# Patient Record
Sex: FEMALE | Race: WHITE | NOT HISPANIC OR LATINO | Employment: OTHER | URBAN - METROPOLITAN AREA
[De-identification: names, ages, dates, MRNs, and addresses within clinical notes are randomized per-mention and may not be internally consistent; named-entity substitution may affect disease eponyms.]

---

## 2017-04-11 ENCOUNTER — HOSPITAL ENCOUNTER (EMERGENCY)
Facility: HOSPITAL | Age: 62
Discharge: HOME | End: 2017-04-11
Attending: EMERGENCY MEDICINE
Payer: MEDICARE

## 2017-04-11 VITALS
BODY MASS INDEX: 40.29 KG/M2 | HEART RATE: 60 BPM | DIASTOLIC BLOOD PRESSURE: 77 MMHG | RESPIRATION RATE: 20 BRPM | WEIGHT: 236 LBS | HEIGHT: 64 IN | OXYGEN SATURATION: 98 % | TEMPERATURE: 98 F | SYSTOLIC BLOOD PRESSURE: 124 MMHG

## 2017-04-11 DIAGNOSIS — H53.19 PHOTOPSIA OF RIGHT EYE: ICD-10-CM

## 2017-04-11 DIAGNOSIS — H57.11 OCULAR PAIN, RIGHT: Primary | ICD-10-CM

## 2017-04-11 PROCEDURE — 25000003 PHARM REV CODE 250: Performed by: EMERGENCY MEDICINE

## 2017-04-11 PROCEDURE — 99283 EMERGENCY DEPT VISIT LOW MDM: CPT

## 2017-04-11 RX ORDER — PROPARACAINE HYDROCHLORIDE 5 MG/ML
1 SOLUTION/ DROPS OPHTHALMIC
Status: COMPLETED | OUTPATIENT
Start: 2017-04-11 | End: 2017-04-11

## 2017-04-11 RX ORDER — CARBOXYMETHYLCELLULOSE SODIUM 5 MG/ML
1 SOLUTION/ DROPS OPHTHALMIC 3 TIMES DAILY PRN
COMMUNITY

## 2017-04-11 RX ADMIN — PROPARACAINE HYDROCHLORIDE 1 DROP: 5 SOLUTION/ DROPS OPHTHALMIC at 09:04

## 2017-04-11 RX ADMIN — FLUORESCEIN SODIUM 1 STRIP: 1 STRIP OPHTHALMIC at 09:04

## 2017-04-11 NOTE — ED AVS SNAPSHOT
[facility]  [place] [address]               Ailin Brown   2017  8:13 PM   ED    Description:  Female : 1955   Department:  [facility]           Your Care was Coordinated By:     Provider Role From To    Casey Campbell III, MD Attending Provider 17 --    Eduardo Brown, NP Nurse Practitioner 17 --      Reason for Visit     Eye Pain           Diagnoses this Visit        Comments    Ocular pain, right    -  Primary     Photopsia of right eye           ED Disposition     ED Disposition Condition Comment    Discharge  Follow up with Yoan Gar MD (or another ophthalmologist) as soon as possible for further evaluation.    Return to the emergency department for any new or worsening symptoms.            To Do List           Follow-up Information     Go to [facility].    Specialty:  Emergency Medicine    Why:  If symptoms worsen, As needed    Contact information:    [place] [address] [phone]        Follow up with Yoan Gar MD. Schedule an appointment as soon as possible for a visit in 1 day.    Specialty:  Ophthalmology    Why:  For further evaluation    Contact information:    [address] [address] [phone]        [facility]     [facility] Nurse Care Line - 24/ Assistance  Unless otherwise directed by your provider, please contact [facility], our nurse care line that is available for /7 assistance.     Registered nurses in the [facility] provide: appointment scheduling, clinical advisement, health education, and other advisory services.  Call: [phone] (toll free)               Medications           Message regarding Medications     Verify the changes and/or additions to your medication regime listed below are the same as discussed with your clinician today.  If any of these  "changes or additions are incorrect, please notify your healthcare provider.        These medications were administered today        Dose Freq    proparacaine 0.5 % ophthalmic solution 1 drop 1 drop ED 1 Time    Sig: Place 1 drop into the right eye ED 1 Time.    Class: Normal    Route: Right Eye    fluorescein ophthalmic strip 1 strip 1 strip ED 1 Time    Sig: Place 1 strip into the right eye ED 1 Time.    Class: Normal    Route: Right Eye           Verify that the below list of medications is an accurate representation of the medications you are currently taking.  If none reported, the list may be blank. If incorrect, please contact your healthcare provider. Carry this list with you in case of emergency.           Current Medications     carboxymethylcellulose (REFRESH PLUS) 0.5 % Dpet 1 drop 3 (three) times daily as needed.           Clinical Reference Information           Your Vitals Were     BP Pulse Temp Resp Height Weight    137/61 (BP Location: Left arm, Patient Position: Sitting) 61 98.1 °F (36.7 °C) (Oral) 18 5' 4" (1.626 m) 107 kg (236 lb)    SpO2 BMI             97% 40.51 kg/m2         Allergies as of 4/11/2017        Reactions    Shellfish Containing Products Anaphylaxis      Immunizations Administered on Date of Encounter - 4/11/2017     None      ED Micro, Lab, POCT     None      ED Imaging Orders     None        Discharge Instructions       Follow up with Yoan Gar MD (or another ophthalmologist) as soon as possible for further evaluation.    Return to the emergency department for any new or worsening symptoms.     Discharge References/Attachments     EYE, HOW IT WORKS (ENGLISH)      [facility] Sign-Up     Activating your [facility] account is as easy as 1-2-3!     1) Visit [email or web address], select Sign Up Now, enter this activation code and your date of birth, then select Next.  [email or web address]  Expires: 5/26/2017 10:16 PM      2) Create a username and password to use when you visit [facility] " in the future and select a security question in case you lose your password and select Next.    3) Enter your e-mail address and click Sign Up!    Additional Information  If you have questions, please e-mail [email or web address] or call [phone] to talk to our [facility] staff. Remember, [facility] is NOT to be used for urgent needs. For medical emergencies, dial 911.          [facility] complies with applicable Federal civil rights laws and does not discriminate on the basis of race, color, national origin, age, disability, or sex.        Language Assistance Services     ATTENTION: Language assistance services are available, free of charge. Please call [phone].      ATENCIÓN: Si adrienne annie, tiene a donaldson disposición servicios gratuitos de asistencia lingüística. Llame al [phone].     CHÚ Ý: N?u b?n nói Ti?ng Vi?t, có các d?ch v? h? tr? ngôn ng? mi?n phí dành cho b?n. G?i s? [phone].

## 2017-04-12 NOTE — DISCHARGE INSTRUCTIONS
Follow up with Yoan Gar MD (or another ophthalmologist) as soon as possible for further evaluation.    Return to the emergency department for any new or worsening symptoms.

## 2017-04-12 NOTE — ED TRIAGE NOTES
"Pt reports she has had a hx of flashes to her right eye last November, and reports she has had another episode today. Pt states, "My eye doctor told me to use artifical tears, and I used it, and it didn't help." Pt reports pain and blurred vision to right eye that began 0300 this morning.  "

## 2017-04-12 NOTE — ED PROVIDER NOTES
"Encounter Date: 4/11/2017       History     Chief Complaint   Patient presents with    Eye Pain     "Karmen got something in my right eye and mits getting worse ."     Review of patient's allergies indicates:  Allergies not on file  HPI Comments: 61 year old female c/o sudden right eye pain and a circular black disc in central visual field that began several hours ago. Pain has been continuous but waxes and wane. Also complaining of flashes of light in the eye. Denies trauma, known foreign body or other precipitating event. Pt was knitting at the time symptoms began. Pt has been using carboxymethylcellulose (Refresh Plus) drops for chronic xerophthalmia. She denies eye discharge.    The history is provided by the patient.     History reviewed. No pertinent past medical history.  Past Surgical History:   Procedure Laterality Date    CARDIAC PACEMAKER PLACEMENT      FOOT SURGERY       Family History   Problem Relation Age of Onset    Retinal detachment Mother     Blindness Brother      Social History   Substance Use Topics    Smoking status: Never Smoker    Smokeless tobacco: None    Alcohol use No     Review of Systems   Constitutional: Negative for chills and fever.   HENT: Negative for congestion, ear pain, postnasal drip, rhinorrhea, sore throat and voice change.    Eyes: Positive for pain, itching and visual disturbance. Negative for photophobia, discharge and redness.   Respiratory: Negative for apnea, cough, chest tightness, shortness of breath and wheezing.    Cardiovascular: Negative for chest pain, palpitations and leg swelling.   Gastrointestinal: Negative for abdominal pain, diarrhea, nausea and vomiting.   Genitourinary: Negative for difficulty urinating and dysuria.   Musculoskeletal: Negative for arthralgias, back pain, myalgias and neck pain.   Skin: Negative for color change, pallor, rash and wound.   Neurological: Negative for dizziness, syncope, speech difficulty and headaches.   Hematological: " Negative for adenopathy.   Psychiatric/Behavioral: The patient is not nervous/anxious.        Physical Exam   Initial Vitals   BP Pulse Resp Temp SpO2   04/11/17 1931 04/11/17 1931 04/11/17 1931 04/11/17 1931 04/11/17 1931   137/61 61 18 98.1 °F (36.7 °C) 97 %     Physical Exam    Nursing note and vitals reviewed.  Constitutional: She appears well-developed and well-nourished. She is not diaphoretic. No distress.   HENT:   Head: Normocephalic and atraumatic.   Right Ear: External ear normal.   Left Ear: External ear normal.   Nose: Nose normal.   Eyes: Conjunctivae, EOM and lids are normal. Pupils are equal, round, and reactive to light. Lids are everted and swept, no foreign bodies found. Right eye exhibits no chemosis, no discharge, no exudate and no hordeolum. No foreign body present in the right eye. Left eye exhibits no chemosis, no discharge, no exudate and no hordeolum. No foreign body present in the left eye. Right conjunctiva is not injected. Right conjunctiva has no hemorrhage. Left conjunctiva is not injected. Left conjunctiva has no hemorrhage. No scleral icterus. Right eye exhibits normal extraocular motion and no nystagmus. Left eye exhibits normal extraocular motion and no nystagmus.   Fundoscopic exam:       The right eye shows no AV nicking, no exudate, no hemorrhage and no papilledema. The right eye shows red reflex.   Slit lamp exam:       The right eye shows no corneal abrasion, no corneal flare, no corneal ulcer, no foreign body, no hyphema, no hypopyon and no anterior chamber bulge.   Red reflex present bilaterally   Neck: Normal range of motion. Neck supple. No thyromegaly present. No tracheal deviation present. No JVD present.   Cardiovascular: Normal rate, regular rhythm, normal heart sounds and intact distal pulses. Exam reveals no gallop and no friction rub.    No murmur heard.  Pulmonary/Chest: Breath sounds normal. No stridor. No respiratory distress. She has no wheezes. She has no  rhonchi. She has no rales. She exhibits no tenderness.   Abdominal: Soft. Bowel sounds are normal. She exhibits no distension and no mass. There is no tenderness. There is no rebound and no guarding.   Musculoskeletal: Normal range of motion. She exhibits no edema or tenderness.   Lymphadenopathy:     She has no cervical adenopathy.   Neurological: She is alert and oriented to person, place, and time. She has normal strength and normal reflexes. She displays normal reflexes. No cranial nerve deficit or sensory deficit.   Skin: Skin is warm and dry. No rash and no abscess noted. No erythema. No pallor.   Psychiatric: She has a normal mood and affect. Her behavior is normal. Judgment and thought content normal.         ED Course   Procedures  Labs Reviewed - No data to display          Medical Decision Making:   ED Management:  Emergent evaluation of a 61 year old female with Hx of arrhythmias presenting with right eye pain and visual disturbance that began several hours ago and has been continuous. Pain waxes and wanes but is constantly present. She reports central visual disturbance described as a dark disc and photopsia. On exam pulse indicates rhythm is regular. Implanted pacemaker noted. Red reflex is present. PERRLA. Visual acuity with glasses 20/25 R eye; 20/40 L eye. IOP right eye checked x 3 is 15 mm Hg, 17 mm Hg, 16 mm Hg. Fluorescein stain and Wood's lamp illumination revealed no abrasion, ulceration, or foreign body. Eyelid eversion reveals no abnormalities. Slit lamp exam performed by Casey Campbell MD did not reveal any abnormalities. Bedside ocular ultrasound performed by Casey Campbell MD shows no evidence of retinal detachment or other abnormality. Based on these findings I suspect right eye pain and visual disturbance. I considered but doubt migraine with aura, iritis, retinal artery occulusion, ocular foreign body, corneal abrasion. Instructed pt to follow up with ophthalmology as soon as possible. Pt  discharged home with instructions for follow up and supportive care. ED return precautions given. Pt verbalized understanding of all information and instructions given. This case was discussed with Casey Campbell MD who agreed with the assessment and plan.    Other:   I have discussed this case with another health care provider.              Attending Attestation:     Physician Attestation Statement for NP/PA:   I have conducted a face to face encounter with this patient in addition to the NP/PA, due to NP/PA Request    Other NP/PA Attestation Additions:      Medical Decision Makin yo F p/w acute onset right eye pain, scotoma, and pruritus that began a few hours ago.  Scotoma described as a dark [place] in the center of her visual axis.  She does also report some flashing lights.  No headache.  No trauma, + FB sensation. H/o chronic R eye irritation for which she saw an Ophthalmologist a few months ago.  After instillation of proparacaine drops the patient does report improvement in her pain and foreign body sensation.  I have measured her intraocular pressure on the right at 15, 17, and 16.  There is no fluorescein uptake.  Slit-lamp examination does not reveal cell and flare, hypopyon, or hyphema.  I have performed a bedside ultrasound to screen for retinal detachment and there was none visualized.  Visual acuity in the affected eye is better than in the unaffected eye.  Presence of pain with onset of symptoms makes CRAO/CRVO much less likely.  ? Ocular migraine vs retinal detachment vs other. Will recommend f/u with Ophthalmology tomorrow.                   ED Course     Clinical Impression:   The primary encounter diagnosis was Ocular pain, right. A diagnosis of Photopsia of right eye was also pertinent to this visit.    Disposition:   Disposition: Discharged  Condition: Stable       Eduardo Brown NP  17 [id number]       Casey Campbell III, MD  17 [phone]

## 2017-04-12 NOTE — ED PROVIDER NOTES
"Encounter Date: 2017       History     Chief Complaint   Patient presents with    Eye Pain     "Karmen got something in my right eye and mits getting worse ."     Review of patient's allergies indicates:   Allergen Reactions    Shellfish containing products Anaphylaxis     HPI  History reviewed. No pertinent past medical history.  Past Surgical History:   Procedure Laterality Date    CARDIAC PACEMAKER PLACEMENT      FOOT SURGERY       Family History   Problem Relation Age of Onset    Retinal detachment Mother     Blindness Brother      Social History   Substance Use Topics    Smoking status: Never Smoker    Smokeless tobacco: None    Alcohol use No     Review of Systems    Physical Exam   Initial Vitals   BP Pulse Resp Temp SpO2   17   137/61 61 18 98.1 °F (36.7 °C) 97 %     Physical Exam    ED Course   Procedures  Labs Reviewed - No data to display                       Attending Attestation:     Physician Attestation Statement for NP/PA:   I have conducted a face to face encounter with this patient in addition to the NP/PA, due to Medical Complexity    Other NP/PA Attestation Additions:      Medical Decision Makin yo F p/w R eye pain and pruritis that began a few hours ago. Also notes a dark [place] in the center of the visual axis. No trauma, no FB sensation. H/o chronic R eye irritation for which she saw an Ophthalmologist a few months ago.                  ED Course     Clinical Impression:   {Add your Clinical Impression here. If you haven't documented one yet, please pend the note, finalize a Clinical Impression, and refresh your note before signing.:[id number]}       "

## 2017-04-25 ENCOUNTER — TELEPHONE (OUTPATIENT)
Dept: OPHTHALMOLOGY | Facility: CLINIC | Age: 62
End: 2017-04-25

## 2017-04-27 ENCOUNTER — OFFICE VISIT (OUTPATIENT)
Dept: OPHTHALMOLOGY | Facility: CLINIC | Age: 62
End: 2017-04-27
Payer: MEDICARE

## 2017-04-27 DIAGNOSIS — H57.11 EYE PAIN, RIGHT: ICD-10-CM

## 2017-04-27 DIAGNOSIS — H43.811 VITREOUS DETACHMENT, RIGHT: Primary | ICD-10-CM

## 2017-04-27 DIAGNOSIS — I10 ESSENTIAL HYPERTENSION: ICD-10-CM

## 2017-04-27 DIAGNOSIS — E11.9 DM TYPE 2 WITHOUT RETINOPATHY: ICD-10-CM

## 2017-04-27 DIAGNOSIS — H52.7 REFRACTIVE ERROR: ICD-10-CM

## 2017-04-27 PROCEDURE — 99212 OFFICE O/P EST SF 10 MIN: CPT | Mod: PBBFAC,PO | Performed by: OPHTHALMOLOGY

## 2017-04-27 PROCEDURE — 99999 PR PBB SHADOW E&M-EST. PATIENT-LVL II: CPT | Mod: PBBFAC,,, | Performed by: OPHTHALMOLOGY

## 2017-04-27 PROCEDURE — 92004 COMPRE OPH EXAM NEW PT 1/>: CPT | Mod: S$PBB,,, | Performed by: OPHTHALMOLOGY

## 2017-04-27 RX ORDER — LOSARTAN POTASSIUM 25 MG/1
TABLET ORAL
Refills: 3 | COMMUNITY
Start: 2017-03-22

## 2017-04-27 RX ORDER — ZOLPIDEM TARTRATE 10 MG/1
TABLET ORAL NIGHTLY PRN
COMMUNITY
Start: 2017-04-26

## 2017-04-27 RX ORDER — ALBUTEROL SULFATE 90 UG/1
2 AEROSOL, METERED RESPIRATORY (INHALATION) EVERY 6 HOURS PRN
COMMUNITY

## 2017-04-27 RX ORDER — GLIMEPIRIDE 2 MG/1
TABLET ORAL
Refills: 3 | COMMUNITY
Start: 2017-03-22

## 2017-04-27 RX ORDER — FEXOFENADINE HCL AND PSEUDOEPHEDRINE HCI 180; 240 MG/1; MG/1
1 TABLET, EXTENDED RELEASE ORAL DAILY
COMMUNITY
End: 2018-03-28 | Stop reason: ALTCHOICE

## 2017-04-27 RX ORDER — ASPIRIN 325 MG
325 TABLET ORAL DAILY
COMMUNITY
End: 2018-02-14

## 2017-04-27 RX ORDER — FENOFIBRATE 30 MG/1
30 CAPSULE ORAL DAILY
COMMUNITY

## 2017-04-27 RX ORDER — ATENOLOL 25 MG/1
TABLET ORAL
Refills: 3 | COMMUNITY
Start: 2017-03-22

## 2017-04-27 NOTE — MR AVS SNAPSHOT
[facility]  [address]  [place] [address]  Phone: [phone]  Fax: [phone]                  Ailin Brown   2017 3:00 PM   Office Visit    Description:  Female : 1955   Provider:  Yoan Gar MD   Department:  [facility]           Reason for Visit     Concerns About Ocular Health           Diagnoses this Visit        Comments    Vitreous detachment, right    -  Primary     Eye pain, right         DM type 2 without retinopathy         Essential hypertension         Refractive error                To Do List           Future Appointments        Provider Department Dept Phone    2017 4:00 PM Yoan Gar MD [facility] [phone]      Goals (5 Years of Data)     None      Follow-Up and Disposition     Return in about 2 weeks (around 2017) for MRx (EPRX).      [facility]     [facility] On Call Nurse Care Line - 24/ Assistance  Unless otherwise directed by your provider, please contact [facility], our nurse care line that is available for  assistance.     Registered nurses in the [facility] On Call Center provide: appointment scheduling, clinical advisement, health education, and other advisory services.  Call: [phone] (toll free)               Medications           Message regarding Medications     Verify the changes and/or additions to your medication regime listed below are the same as discussed with your clinician today.  If any of these changes or additions are incorrect, please notify your healthcare provider.             Verify that the below list of medications is an accurate representation of the medications you are currently taking.  If none reported, the list may be blank. If incorrect, please contact your healthcare provider. Carry this list with you in case of emergency.           Current Medications     ACCU-CHEK COMPACT PLUS TEST Strp USE ONCE D TO TEST BLOOD SUGAR    albuterol  (PROAIR HFA) 90 mcg/actuation inhaler Inhale 2 puffs into the lungs every 6 (six) hours as needed for Wheezing. Rescue    aspirin 325 MG tablet Take 325 mg by mouth once daily.    atenolol (TENORMIN) 25 MG tablet TK 1 T PO D    fenofibrate micronized 30 mg Cap Take 30 mg by mouth once daily.    fexofenadine-pseudoephedrine (ALLEGRA-D 24 HOUR) 180-240 mg per 24 hr tablet Take 1 tablet by mouth once daily.    glimepiride (AMARYL) 2 MG tablet TK 1 T PO D WITH XENA    losartan (COZAAR) 25 MG tablet TK 1 T PO BID    zolpidem (AMBIEN) 10 mg Tab     carboxymethylcellulose (REFRESH PLUS) 0.5 % Dpet 1 drop 3 (three) times daily as needed.           Clinical Reference Information           Allergies as of 4/27/2017     Shellfish Containing Products      Immunizations Administered on Date of Encounter - 4/27/2017     None      [facility] Sign-Up     Activating your [facility] account is as easy as 1-2-3!     1) Visit [email or web address], select Sign Up Now, enter this activation code and your date of birth, then select Next.  [email or web address]  Expires: 5/26/2017 10:16 PM      2) Create a username and password to use when you visit [facility] in the future and select a security question in case you lose your password and select Next.    3) Enter your e-mail address and click Sign Up!    Additional Information  If you have questions, please e-mail [email or web address] or call [phone] to talk to our [facility] staff. Remember, [facility] is NOT to be used for urgent needs. For medical emergencies, dial 911.         Language Assistance Services     ATTENTION: Language assistance services are available, free of charge. Please call [phone].      ATENCIÓN: Si habla español, tiene a donaldson disposición servicios gratuitos de asistencia lingüística. Llame al [phone].     CHÚ Ý: N?u b?n nói Ti?ng Vi?t, có các d?ch v? h? tr? ngôn ng? mi?n phí dành cho b?n. G?i s? [phone].         [facility] complies with  applicable Federal civil rights laws and does not discriminate on the basis of race, color, national origin, age, disability, or sex.

## 2017-04-27 NOTE — PROGRESS NOTES
Subjective:       Patient ID: Ailin Brown is a 61 y.o. female.    Chief Complaint: Concerns About Ocular Health (Pt states that in the novemeber light from oncoming cars was bothersome an cause Flashes of light.)    HPI  Review of Systems    Objective:      Physical Exam    Assessment:       1. Vitreous detachment, right    2. Eye pain, right    3. DM type 2 without retinopathy    4. Essential hypertension    5. Refractive error        Plan:       PVD OD-Causing floaters.  Eye pain OD-Most likely from accommodative spasms from blurry vision OD>OS.  DM-No NPDR OU.  HTN-No retinopathy OU.  RE-Needs MRx.        Control DM & HTN.  RTC 2 wks for MRx.

## 2017-05-11 ENCOUNTER — OFFICE VISIT (OUTPATIENT)
Dept: OPHTHALMOLOGY | Facility: CLINIC | Age: 62
End: 2017-05-11
Payer: MEDICARE

## 2017-05-11 DIAGNOSIS — H52.7 REFRACTIVE ERROR: Primary | ICD-10-CM

## 2017-05-11 DIAGNOSIS — H57.11 EYE PAIN, RIGHT: ICD-10-CM

## 2017-05-11 PROCEDURE — 99212 OFFICE O/P EST SF 10 MIN: CPT | Mod: PBBFAC,PO | Performed by: OPHTHALMOLOGY

## 2017-05-11 PROCEDURE — 99499 UNLISTED E&M SERVICE: CPT | Mod: S$PBB,,, | Performed by: OPHTHALMOLOGY

## 2017-05-11 PROCEDURE — 99999 PR PBB SHADOW E&M-EST. PATIENT-LVL II: CPT | Mod: PBBFAC,,, | Performed by: OPHTHALMOLOGY

## 2017-05-11 NOTE — MR AVS SNAPSHOT
[facility]  [address]  [place] [address]  Phone: [phone]  Fax: [phone]                  Ailin Brown   2017 4:00 PM   Office Visit    Description:  Female : 1955   Provider:  Yoan Gar MD   Department:  [facility]           Reason for Visit     Concerns About Ocular Health           Diagnoses this Visit        Comments    Refractive error    -  Primary     Eye pain, right                To Do List           Goals (5 Years of Data)     None      Follow-Up and Disposition     Return in about 1 year (around 2018) for 1 yr F/U..      [facility]     [facility] Care [facility] -  Assistance  Unless otherwise directed by your provider, please contact [facility], our nurse care line that is available for  assistance.     Registered nurses in the [facility] provide: appointment scheduling, clinical advisement, health education, and other advisory services.  Call: [phone] (toll free)               Medications           Message regarding Medications     Verify the changes and/or additions to your medication regime listed below are the same as discussed with your clinician today.  If any of these changes or additions are incorrect, please notify your healthcare provider.             Verify that the below list of medications is an accurate representation of the medications you are currently taking.  If none reported, the list may be blank. If incorrect, please contact your healthcare provider. Carry this list with you in case of emergency.           Current Medications     ACCU-CHEK COMPACT PLUS TEST Strp USE ONCE D TO TEST BLOOD SUGAR    albuterol (PROAIR HFA) 90 mcg/actuation inhaler Inhale 2 puffs into the lungs every 6 (six) hours as needed for Wheezing. Rescue    aspirin 325 MG tablet Take 325 mg by mouth once daily.    atenolol (TENORMIN) 25 MG tablet TK 1 T PO D    carboxymethylcellulose  (REFRESH PLUS) 0.5 % Dpet 1 drop 3 (three) times daily as needed.    fenofibrate micronized 30 mg Cap Take 30 mg by mouth once daily.    fexofenadine-pseudoephedrine (ALLEGRA-D 24 HOUR) 180-240 mg per 24 hr tablet Take 1 tablet by mouth once daily.    glimepiride (AMARYL) 2 MG tablet TK 1 T PO D WITH XENA    losartan (COZAAR) 25 MG tablet TK 1 T PO BID    zolpidem (AMBIEN) 10 mg Tab            Clinical Reference Information           Allergies as of 5/11/2017     Shellfish Containing Products      Immunizations Administered on Date of Encounter - 5/11/2017     None      [facility] Sign-Up     Activating your [facility] account is as easy as 1-2-3!     1) Visit [email or web address], select Sign Up Now, enter this activation code and your date of birth, then select Next.  [email or web address]  Expires: 5/26/2017 10:16 PM      2) Create a username and password to use when you visit [facility] in the future and select a security question in case you lose your password and select Next.    3) Enter your e-mail address and click Sign Up!    Additional Information  If you have questions, please e-mail [email or web address] or call [phone] to talk to our [facility] staff. Remember, [facility] is NOT to be used for urgent needs. For medical emergencies, dial 911.         Language Assistance Services     ATTENTION: Language assistance services are available, free of charge. Please call [phone].      ATENCIÓN: Si habla español, tiene a donaldson disposición servicios gratuitos de asistencia lingüística. Llame al [phone].     CHÚ Ý: N?u b?n nói Ti?ng Vi?t, có các d?ch v? h? tr? ngôn ng? mi?n phí dành cho b?n. G?i s? [phone].         [facility] complies with applicable Federal civil rights laws and does not discriminate on the basis of race, color, national origin, age, disability, or sex.

## 2017-05-12 NOTE — PROGRESS NOTES
Subjective:       Patient ID: Ailin Brown is a 61 y.o. female.    Chief Complaint: Concerns About Ocular Health (MRX )    HPI  Review of Systems    Objective:      Physical Exam    Assessment:       1. Refractive error    2. Eye pain, right        Plan:       RE-Pt wants MRx.  Eye pain-Most likely from accommodative spasms.      Give MRx.  RTC 1 yr.

## 2018-02-14 ENCOUNTER — HOSPITAL ENCOUNTER (EMERGENCY)
Facility: HOSPITAL | Age: 63
Discharge: HOME | End: 2018-02-14
Attending: EMERGENCY MEDICINE
Payer: MEDICARE

## 2018-02-14 ENCOUNTER — OFFICE VISIT (OUTPATIENT)
Dept: CARDIOLOGY | Facility: CLINIC | Age: 63
End: 2018-02-14
Payer: MEDICARE

## 2018-02-14 ENCOUNTER — ANTI-COAG VISIT (OUTPATIENT)
Dept: CARDIOLOGY | Facility: CLINIC | Age: 63
End: 2018-02-14

## 2018-02-14 VITALS
RESPIRATION RATE: 16 BRPM | SYSTOLIC BLOOD PRESSURE: 116 MMHG | HEIGHT: 64 IN | OXYGEN SATURATION: 96 % | DIASTOLIC BLOOD PRESSURE: 74 MMHG | BODY MASS INDEX: 39.09 KG/M2 | WEIGHT: 229 LBS | HEART RATE: 60 BPM | TEMPERATURE: 99 F

## 2018-02-14 VITALS
SYSTOLIC BLOOD PRESSURE: 121 MMHG | OXYGEN SATURATION: 95 % | WEIGHT: 229 LBS | BODY MASS INDEX: 39.09 KG/M2 | DIASTOLIC BLOOD PRESSURE: 63 MMHG | RESPIRATION RATE: 15 BRPM | HEIGHT: 64 IN | HEART RATE: 61 BPM

## 2018-02-14 DIAGNOSIS — I25.10 CORONARY ARTERY DISEASE INVOLVING NATIVE CORONARY ARTERY OF NATIVE HEART WITHOUT ANGINA PECTORIS: ICD-10-CM

## 2018-02-14 DIAGNOSIS — E66.01 MORBID OBESITY, UNSPECIFIED OBESITY TYPE: ICD-10-CM

## 2018-02-14 DIAGNOSIS — I48.20 CHRONIC ATRIAL FIBRILLATION: ICD-10-CM

## 2018-02-14 DIAGNOSIS — I48.20 CHRONIC ATRIAL FIBRILLATION: Primary | ICD-10-CM

## 2018-02-14 DIAGNOSIS — I10 ESSENTIAL HYPERTENSION: ICD-10-CM

## 2018-02-14 DIAGNOSIS — E78.5 HYPERLIPIDEMIA, UNSPECIFIED HYPERLIPIDEMIA TYPE: ICD-10-CM

## 2018-02-14 DIAGNOSIS — Z79.01 LONG TERM (CURRENT) USE OF ANTICOAGULANTS: Primary | ICD-10-CM

## 2018-02-14 DIAGNOSIS — T14.90XA TRAUMA: Primary | ICD-10-CM

## 2018-02-14 DIAGNOSIS — E11.9 DM TYPE 2 WITHOUT RETINOPATHY: ICD-10-CM

## 2018-02-14 DIAGNOSIS — S93.504A: ICD-10-CM

## 2018-02-14 PROCEDURE — 99205 OFFICE O/P NEW HI 60 MIN: CPT | Mod: S$PBB,,, | Performed by: INTERNAL MEDICINE

## 2018-02-14 PROCEDURE — 93005 ELECTROCARDIOGRAM TRACING: CPT | Mod: PBBFAC | Performed by: INTERNAL MEDICINE

## 2018-02-14 PROCEDURE — 99283 EMERGENCY DEPT VISIT LOW MDM: CPT | Mod: 25,27

## 2018-02-14 PROCEDURE — 93010 ELECTROCARDIOGRAM REPORT: CPT | Mod: S$PBB,,, | Performed by: INTERNAL MEDICINE

## 2018-02-14 PROCEDURE — 99214 OFFICE O/P EST MOD 30 MIN: CPT | Mod: PBBFAC,25 | Performed by: INTERNAL MEDICINE

## 2018-02-14 PROCEDURE — 99999 PR PBB SHADOW E&M-EST. PATIENT-LVL IV: CPT | Mod: PBBFAC,,, | Performed by: INTERNAL MEDICINE

## 2018-02-14 RX ORDER — ASPIRIN 81 MG/1
81 TABLET ORAL DAILY
Qty: 90 TABLET | Refills: 3 | COMMUNITY
Start: 2018-02-14 | End: 2018-03-28 | Stop reason: ALTCHOICE

## 2018-02-14 RX ORDER — IBUPROFEN 600 MG/1
600 TABLET ORAL
Status: DISCONTINUED | OUTPATIENT
Start: 2018-02-14 | End: 2018-02-14

## 2018-02-14 RX ORDER — WARFARIN SODIUM 5 MG/1
5 TABLET ORAL DAILY
Qty: 30 TABLET | Refills: 11 | Status: SHIPPED | OUTPATIENT
Start: 2018-02-14 | End: 2018-05-17 | Stop reason: ALTCHOICE

## 2018-02-14 NOTE — DISCHARGE INSTRUCTIONS
Try to get plenty of rest and stay well-hydrated on these medications.  You can wear the buddy tape to help immobilize the toe and allow it to rest.  RICE - Rest, Ice, Compress, Elevate (see handout).    Please take medications as prescribed.    Take Ibuprofen or Aleve for pain, as needed.    Follow up with orthopedics in 1 week if symptoms have not improved.    Return to ER for any new or worsening symptoms.

## 2018-02-14 NOTE — PROGRESS NOTES
62 year old patient with PMHx including essential hypertension, chronic atrial fibrillation, prior stroke, hyperlipidemia, CAD, MI, DM type 2, morbid obesity. Per ntoes, she has tried coumadin as well as other anticoagulants in the past but could not tolerate. However, She needs to be placed back on coumadin in preparation for Watchman implant in [place], which is scheduled for 3/5/18. Per notes from her visit with Dr Harris today, start warfarin 5mg daily. lmfcb

## 2018-02-14 NOTE — ED PROVIDER NOTES
"Encounter Date: 2/14/2018    SCRIBE #1 NOTE: I, Elmira Oconnor, am scribing for, and in the presence of,  Suki Jones NP. I have scribed the following portions of the note - Other sections scribed: HPI and ROS.       History     Chief Complaint   Patient presents with    Toe Pain     "I came to see Cardiologist and fell in parking lot before I saw him and I hurt my toes. He told me to come to fast track." pt complains of rigth foot toe pain, states she is diabetic; denies hitting head or LOC, states she was dizzy and has numerous synopal episodes, cardiologist saw her after the incident, checked her out, and told her to come to fast track to get toes checked out    Fall     CC: Fall and Toe Pain    HPI: The pt is a 62 y.o. F with a PMHx that includes asthma, diabetes, and HTN who presents to the ED for evaluation of a fall and c/o toe pain x today at 1000 hrs. Pt states that in the parking lot on her way to see the cardiologist today, pt suddenly felt dizzy and then began to fall but caught herself before actually falling. Pt states that the 2nd, 4th, and 5th toes of her feet "curled under" when she fell. She states that she has had syncopal episodes w/ dizziness in the past. Pt says that she was still able to visit her cardiologist, but they sent her to this ED to get her toes checked. She currently reports bruising and pain on range of motion in the 3rd toe of her R foot. No attempted treatments reported. Pt otherwise denies current dizziness, head trauma, chest pain, palpitations, neck pain, back pain, and other associated symptoms.       The history is provided by the patient. No  was used.     Review of patient's allergies indicates:   Allergen Reactions    Shellfish containing products Anaphylaxis     Past Medical History:   Diagnosis Date    Asthma     Cataract     Diabetes mellitus     Hypertension      Past Surgical History:   Procedure Laterality Date    ATRIAL CARDIAC PACEMAKER " INSERTION      CARDIAC PACEMAKER PLACEMENT      FOOT SURGERY       Family History   Problem Relation Age of Onset    Retinal detachment Mother     Blindness Brother     Amblyopia Brother     Macular degeneration Brother     Retinal detachment Brother     Glaucoma Father     Glaucoma Paternal Grandmother     Blindness Paternal Grandmother     Cataracts Neg Hx     Strabismus Neg Hx      Social History   Substance Use Topics    Smoking status: Never Smoker    Smokeless tobacco: Never Used    Alcohol use No     Review of Systems   Constitutional: Negative for chills, diaphoresis and fever.   HENT: Negative for ear pain and sore throat.         (-) head trauma   Eyes: Negative for redness.   Respiratory: Negative for cough and shortness of breath.    Cardiovascular: Negative for chest pain and palpitations.   Gastrointestinal: Negative for abdominal pain, diarrhea, nausea and vomiting.   Genitourinary: Negative for dysuria.   Musculoskeletal: Negative for back pain and neck pain.        (+) 3rd toe pain on R foot (7/10)   Skin: Positive for color change (bruising on 3rd toe of R foot). Negative for rash.   Neurological: Negative for dizziness, syncope and headaches.       Physical Exam     Initial Vitals [02/14/18 1058]   BP Pulse Resp Temp SpO2   (!) 127/58 60 20 98.1 °F (36.7 °C) 95 %      MAP       81         Physical Exam    Nursing note and vitals reviewed.  Constitutional: Vital signs are normal. She appears well-developed and well-nourished. She is not diaphoretic. She is active and cooperative. She does not appear ill. No distress.   Musculoskeletal:        Right foot: There is tenderness and bony tenderness. There is normal range of motion, no swelling, normal capillary refill, no crepitus, no deformity and no laceration.        Feet:    Neurological: She is oriented to person, place, and time.   Skin: Skin is warm and dry. Capillary refill takes less than 2 seconds.         ED Course    Procedures  Labs Reviewed - No data to display       X-Rays:   Independently Interpreted Readings:   Other Readings:  X-ray right third toe with no obvious fracture identified.  No dislocation.       Additional MDM:   Comments: This is an urgent evaluation of a 62-year-old female that presents to the emergency room complaining of toe pain this morning.  Patient reports that she stumbled in the parking lot outside the hospital this morning after a dizzy episode.  Patient has a history of syncopal episodes and was on the way to her cardiologist appointment; she was still seen at cardiology this morning and cleared.  She is asymptomatic on exam without any dizziness or chest pain, shortness of breath, weakness.  She complains of toe pain alone; there is mild tenderness palpation and ecchymosis over the distal phalanx of the right third toe.  The rest of her foot exam was unremarkable.  There is no sensory or motor deficits, open wounds, NV deficits.  She is able to range the toe, though with pain.  Differentials included: Soft tissue injury versus fracture, dislocation.  No evidence support infectious process, compartment syndrome, gout.  XR of the toe shows no definitive fracture.  There is no dislocation identified.  There is a lucency identified in the distal phalanx of the third toe that extends to the soft tissue that is most likely an artifact.  Will treat as sprain apply denis tape.  Recommended orthopedics follow-up within 1 week for further evaluation if there is no significant improvement.  Recommended RICE and she reports that she can take Aleve at home.  Return precautions were given for any new or worsening symptoms or concerns..          Scribe Attestation:   Scribe #1: I performed the above scribed service and the documentation accurately describes the services I performed. I attest to the accuracy of the note.    Attending Attestation:     Physician Attestation Statement for NP/PA:   I discussed this  assessment and plan of this patient with the NP/PA, but I did not personally examine the patient. The face to face encounter was performed by the NP/PA.        Physician Attestation for Scribe:  Physician Attestation Statement for Scribe #1: I, Suki Jones NP, reviewed documentation, as scribed by Elmira Oconnor in my presence, and it is both accurate and complete.                 ED Course      Clinical Impression:   The primary encounter diagnosis was Trauma. A diagnosis of Sprain of third toe of right foot, initial encounter was also pertinent to this visit.    Disposition:   Disposition: Discharged  Condition: Stable                        Suki Jones NP  02/14/18 2329

## 2018-02-14 NOTE — PROGRESS NOTES
CARDIOVASCULAR CONSULTATION    REASON FOR CONSULT:   Ailin Brown is a 62 y.o. female who presents for initiation of CV mgmt.    PCP: Angelica ([place]) [phone]  Card: Lindsay ([place]) [phone]  EP: Arturo ([place]) [phone]  HISTORY OF PRESENT ILLNESS:   The patient is a very pleasant 62-year-old  woman who is self-referred for ongoing cardiac management.  She has a complicated past cardiovascular history as outlined below.  She describes a chronic history of dizziness and palpitations as well as shortness of breath.  She does not describe any angina.  She does describe episodic syncope, and apparently her pacemaker is at and end-of-life.  She has plans in early March of this year to return to her caregivers in [place] for a watchman left atrial appendage occlusion device implant, as well as pacemaker generator replacement.  She denies any PND, orthopnea, or lower extremity edema.  There's been no melena, hematuria, or claudicant symptoms.  The patient does describe a history of atrial fibrillation and prior stroke as well as prior coronary artery disease and myocardial infarction.  She apparently has tried taking both Coumadin, as well as several of the novel oral anticoagulants, and is unable to tolerate any of these medications.  She does tell and that she needs to be placed back on Coumadin in preparation for the watchman implant.  She does not know her usual prior dose of Coumadin, and I plan to start on 5 mg nightly with referral to R Coumadin monitoring clinic for management of her INR.  I've also asked that we obtain records from her prior cardiovascular physicians.    Of note, the patient tells me she stubbed her toe on the way in and think she's broken it.  It appears she's able to move the toe, but I have referred her to urgent care for further evaluation.    Family history appears to be notable for coronary artery disease with the patient's father having myocardial  infarction in his 50s.    The patient is a never smoker.    CARDIOVASCULAR HISTORY:   Chr AF, intol of coumadin with plans for Watchman implant 3/5/18 at [facility] PPM, s/p AVN ablation and PPM implant  Chr AF, prev intol of coumadin/NOAC, hx CVA  ?CAD/MI  ?VSD, spont closed    PAST MEDICAL HISTORY:     Past Medical History:   Diagnosis Date    Cataract        PAST SURGICAL HISTORY:     Past Surgical History:   Procedure Laterality Date    CARDIAC PACEMAKER PLACEMENT      FOOT SURGERY         ALLERGIES AND MEDICATION:     Review of patient's allergies indicates:   Allergen Reactions    Shellfish containing products Anaphylaxis     Previous Medications    ACCU-CHEK COMPACT PLUS TEST STRP    USE ONCE D TO TEST BLOOD SUGAR    ALBUTEROL (PROAIR HFA) 90 MCG/ACTUATION INHALER    Inhale 2 puffs into the lungs every 6 (six) hours as needed for Wheezing. Rescue    ASPIRIN 325 MG TABLET    Take 325 mg by mouth once daily.    ATENOLOL (TENORMIN) 25 MG TABLET    TK 1 T PO D    CARBOXYMETHYLCELLULOSE (REFRESH PLUS) 0.5 % DPET    1 drop 3 (three) times daily as needed.    FENOFIBRATE MICRONIZED 30 MG CAP    Take 30 mg by mouth once daily.    FEXOFENADINE-PSEUDOEPHEDRINE (ALLEGRA-D 24 HOUR) 180-240 MG PER 24 HR TABLET    Take 1 tablet by mouth once daily.    GLIMEPIRIDE (AMARYL) 2 MG TABLET    TK 1 T PO D WITH XENA    LOSARTAN (COZAAR) 25 MG TABLET    TK 1 T PO BID    ZOLPIDEM (AMBIEN) 10 MG TAB           SOCIAL HISTORY:     Social History     Social History    Marital status:      Spouse name: N/A    Number of children: N/A    Years of education: N/A     Occupational History    Not on file.     Social History Main Topics    Smoking status: Never Smoker    Smokeless tobacco: Never Used    Alcohol use No    Drug use: No    Sexual activity: No     Other Topics Concern    Not on file     Social History Narrative    No narrative on file       FAMILY HISTORY:     Family History   Problem Relation Age of  "Onset    Retinal detachment Mother     Blindness Brother     Amblyopia Brother     Macular degeneration Brother     Retinal detachment Brother     Glaucoma Father     Glaucoma Paternal Grandmother     Blindness Paternal Grandmother     Cataracts Neg Hx     Strabismus Neg Hx        REVIEW OF SYSTEMS:   Review of Systems   Constitutional: Negative for chills, diaphoresis and fever.   HENT: Negative for nosebleeds.    Eyes: Negative for blurred vision, double vision and photophobia.   Respiratory: Negative for hemoptysis, shortness of breath and wheezing.    Cardiovascular: Positive for palpitations. Negative for chest pain, orthopnea, claudication, leg swelling and PND.   Gastrointestinal: Negative for abdominal pain, blood in stool, heartburn, melena, nausea and vomiting.   Genitourinary: Negative for flank pain and hematuria.   Musculoskeletal: Negative for falls, myalgias and neck pain.   Skin: Negative for rash.   Neurological: Positive for dizziness and loss of consciousness. Negative for seizures, weakness and headaches.   Endo/Heme/Allergies: Negative for polydipsia. Does not bruise/bleed easily.   Psychiatric/Behavioral: Negative for depression and memory loss. The patient is not nervous/anxious.        PHYSICAL EXAM:     Vitals:    02/14/18 0957   BP: 121/63   Pulse: 61   Resp: 15    Body mass index is 39.31 kg/m².  Weight: 103.9 kg (229 lb)   Height: 5' 4" (162.6 cm)     Physical Exam   Constitutional: She is oriented to person, place, and time. She appears well-developed and well-nourished. She is cooperative.  Non-toxic appearance. No distress.   HENT:   Head: Normocephalic and atraumatic.   Eyes: Conjunctivae and EOM are normal. Pupils are equal, round, and reactive to light. No scleral icterus.   Neck: Trachea normal. Neck supple. Normal carotid pulses and no JVD present. Carotid bruit is not present. No neck rigidity. No edema present. No thyromegaly present.   Cardiovascular: Normal rate, " regular rhythm, S1 normal and S2 normal.  PMI is not displaced.  Exam reveals distant heart sounds. Exam reveals no gallop and no friction rub.    No murmur heard.  Pulses:       Carotid pulses are 2+ on the right side, and 2+ on the left side.  Pulmonary/Chest: Effort normal and breath sounds normal. No respiratory distress. She has no wheezes. She has no rales. She exhibits no tenderness.   Abdominal: Soft. Bowel sounds are normal. She exhibits no distension. There is no hepatosplenomegaly. There is no tenderness.   obese   Musculoskeletal: She exhibits no edema or tenderness.   Feet:   Right Foot:   Skin Integrity: Negative for ulcer.   Left Foot:   Skin Integrity: Negative for ulcer.   Neurological: She is alert and oriented to person, place, and time. No cranial nerve deficit.   Skin: Skin is warm and dry. No rash noted. No erythema.   Psychiatric: She has a normal mood and affect. Her speech is normal and behavior is normal.   Vitals reviewed.      DATA:   EKG: (personally reviewed tracing)  2/14/18  60, ?retrograde p wave    Laboratory:  CBC:        CHEMISTRIES:        CARDIAC BIOMARKERS:        COAGS:        LIPIDS/LFTS:        Cardiovascular Testing:  Records from [facility] requested    ASSESSMENT:   # ?hx CAD/MI  # Chr AF s/p AVN ablation/PPM, previously intol of coumadin/NOAC with plans for Watchman implant 3/5/18 at [facility].  Per pt, coumadin to be started in preparation for Watchman.  # PPM (MDT) at EOL, gen change planned 3/5/18  # HTN, controlled  # DM  # ?elev TG, prev intol of statins  # BMI 39  # hx CVA    PLAN:   Start coumadin 5mg qhs, refer to coumadin monitoring program  Change ASA to 81mg qd  Records requested from [facility]  Pt to bring summaries from [facility] after her Watchman implant and PPM gen change 3/5/18  No testing planned as of today pending receipt of records  RTC 6 weeks    Jun Harris MD, FACC

## 2018-02-17 NOTE — PROGRESS NOTES
Patient was afraid and only took 2.5mg 2/15. She has no history of bleeding. She was advised to take full dose as prescribed, 5mg daily. We will have INR drawn on Monday. First available clinic visit that worked for us and patient not until 2/28. She was advised to be consistent with green leafy veggies. She has been on coumadin in the past, so was aware of dietary restrictions. She does not recall the dose she took as it was not recent. We will follow closely until stable

## 2018-02-19 ENCOUNTER — LAB VISIT (OUTPATIENT)
Dept: LAB | Facility: HOSPITAL | Age: 63
End: 2018-02-19
Attending: INTERNAL MEDICINE
Payer: MEDICARE

## 2018-02-19 DIAGNOSIS — I48.20 CHRONIC ATRIAL FIBRILLATION: ICD-10-CM

## 2018-02-19 DIAGNOSIS — Z79.01 LONG TERM (CURRENT) USE OF ANTICOAGULANTS: ICD-10-CM

## 2018-02-19 LAB
INR PPP: 1.2
PROTHROMBIN TIME: 12.5 SEC

## 2018-02-19 PROCEDURE — 85610 PROTHROMBIN TIME: CPT

## 2018-02-19 PROCEDURE — 36415 COLL VENOUS BLD VENIPUNCTURE: CPT | Mod: PO

## 2018-02-20 ENCOUNTER — ANTI-COAG VISIT (OUTPATIENT)
Dept: CARDIOLOGY | Facility: CLINIC | Age: 63
End: 2018-02-20

## 2018-02-20 DIAGNOSIS — I48.20 CHRONIC ATRIAL FIBRILLATION: ICD-10-CM

## 2018-02-20 DIAGNOSIS — Z79.01 LONG TERM (CURRENT) USE OF ANTICOAGULANTS: ICD-10-CM

## 2018-02-22 ENCOUNTER — LAB VISIT (OUTPATIENT)
Dept: LAB | Facility: HOSPITAL | Age: 63
End: 2018-02-22
Attending: INTERNAL MEDICINE
Payer: MEDICARE

## 2018-02-22 DIAGNOSIS — Z79.01 LONG TERM (CURRENT) USE OF ANTICOAGULANTS: ICD-10-CM

## 2018-02-22 DIAGNOSIS — I48.20 CHRONIC ATRIAL FIBRILLATION: ICD-10-CM

## 2018-02-22 LAB
INR PPP: 2
PROTHROMBIN TIME: 19.7 SEC

## 2018-02-22 PROCEDURE — 85610 PROTHROMBIN TIME: CPT

## 2018-02-22 PROCEDURE — 36415 COLL VENOUS BLD VENIPUNCTURE: CPT | Mod: PO

## 2018-02-23 ENCOUNTER — ANTI-COAG VISIT (OUTPATIENT)
Dept: CARDIOLOGY | Facility: CLINIC | Age: 63
End: 2018-02-23

## 2018-02-23 NOTE — PROGRESS NOTES
Hit her head last night at 8pm on a cabinet door - took 1 aspirin-325mg, had slight bleeding from the bump, has a a raised bump about 1 -1 1/2 inches in diameter, some redness to the bump, has had sweating lately, verified correct dose of coumadin

## 2018-02-28 ENCOUNTER — ANTI-COAG VISIT (OUTPATIENT)
Dept: CARDIOLOGY | Facility: CLINIC | Age: 63
End: 2018-02-28
Payer: MEDICARE

## 2018-02-28 DIAGNOSIS — Z79.01 LONG TERM (CURRENT) USE OF ANTICOAGULANTS: Primary | ICD-10-CM

## 2018-02-28 DIAGNOSIS — I48.20 CHRONIC ATRIAL FIBRILLATION: ICD-10-CM

## 2018-02-28 LAB — INR PPP: 2.1 (ref 2–3)

## 2018-02-28 PROCEDURE — 99211 OFF/OP EST MAY X REQ PHY/QHP: CPT | Mod: S$PBB,25,,

## 2018-02-28 PROCEDURE — 85610 PROTHROMBIN TIME: CPT | Mod: PBBFAC,PO

## 2018-02-28 NOTE — PROGRESS NOTES
Educated patient on coumadin diet restrictions, bleeding risks, potential for medication interactions, and when to call the coumadin clinic. Handouts given for reference. She will be avoiding high vit k foods. She is thinking that coumadin is only temporary. She has Watchman planned next week in [place]. She was advised to continue on coumadin for procedure and keep INR 2.0-3.0. She is leaving today to start the trip to [place]. INR is good. She reports occasional pink urine. Patient advised this could be a sign of something going on with bladder or kidneys. Patient advised to follow up with PCP if it continues. No other signs or symptoms of bleeding.     INR is good. We will continue on current dose plan. Patient advised to call once discharged home from procedure, so that we can plan for any changes and appropriate follow up. We tentatively planned follow up 3/19 since she doesn't think she will be back sooner than that.

## 2018-03-12 NOTE — PROGRESS NOTES
Called patient 3/12. She reports Watchman was done 3/5 and redo PPM on 3/8. She is now on aspirin 325mg daily and clindamycin. She cannot return sooner than 3/19 for INR. She will be on coumadin for 45 days then transition to plavix per [facility]s

## 2018-03-19 ENCOUNTER — ANTI-COAG VISIT (OUTPATIENT)
Dept: CARDIOLOGY | Facility: CLINIC | Age: 63
End: 2018-03-19
Payer: MEDICARE

## 2018-03-19 DIAGNOSIS — Z79.01 LONG TERM (CURRENT) USE OF ANTICOAGULANTS: Primary | ICD-10-CM

## 2018-03-19 DIAGNOSIS — I48.20 CHRONIC ATRIAL FIBRILLATION: ICD-10-CM

## 2018-03-19 LAB — INR PPP: 2.6 (ref 2–3)

## 2018-03-19 PROCEDURE — 85610 PROTHROMBIN TIME: CPT | Mod: PBBFAC,PO

## 2018-03-19 NOTE — PROGRESS NOTES
Quick follow up from watchman on 3/5 and redo PPM on 3/8. Patient reports no bleeding, bruising or other changes. Will close monitor and follow up in 1 week. Advised patient to call with any changes or concerns.

## 2018-03-20 ENCOUNTER — OFFICE VISIT (OUTPATIENT)
Dept: FAMILY MEDICINE | Facility: CLINIC | Age: 63
End: 2018-03-20
Payer: MEDICARE

## 2018-03-20 VITALS
HEART RATE: 59 BPM | BODY MASS INDEX: 39.09 KG/M2 | DIASTOLIC BLOOD PRESSURE: 64 MMHG | HEIGHT: 64 IN | SYSTOLIC BLOOD PRESSURE: 100 MMHG | TEMPERATURE: 98 F | WEIGHT: 229 LBS | OXYGEN SATURATION: 97 %

## 2018-03-20 DIAGNOSIS — I48.20 CHRONIC ATRIAL FIBRILLATION: ICD-10-CM

## 2018-03-20 DIAGNOSIS — Z48.02 VISIT FOR SUTURE REMOVAL: Primary | ICD-10-CM

## 2018-03-20 DIAGNOSIS — E66.01 MORBID OBESITY, UNSPECIFIED OBESITY TYPE: ICD-10-CM

## 2018-03-20 DIAGNOSIS — Z79.01 LONG TERM (CURRENT) USE OF ANTICOAGULANTS: ICD-10-CM

## 2018-03-20 DIAGNOSIS — I10 ESSENTIAL HYPERTENSION: ICD-10-CM

## 2018-03-20 DIAGNOSIS — I25.10 CORONARY ARTERY DISEASE INVOLVING NATIVE CORONARY ARTERY OF NATIVE HEART WITHOUT ANGINA PECTORIS: ICD-10-CM

## 2018-03-20 PROCEDURE — 99204 OFFICE O/P NEW MOD 45 MIN: CPT | Mod: S$PBB,,, | Performed by: FAMILY MEDICINE

## 2018-03-20 PROCEDURE — 99999 PR PBB SHADOW E&M-EST. PATIENT-LVL III: CPT | Mod: PBBFAC,,, | Performed by: FAMILY MEDICINE

## 2018-03-20 PROCEDURE — 99213 OFFICE O/P EST LOW 20 MIN: CPT | Mod: PBBFAC,PO | Performed by: FAMILY MEDICINE

## 2018-03-20 RX ORDER — MINERAL OIL
180 ENEMA (ML) RECTAL DAILY
COMMUNITY

## 2018-03-20 RX ORDER — BLOOD SUGAR DIAGNOSTIC
STRIP MISCELLANEOUS
Refills: 1 | COMMUNITY
Start: 2017-12-19

## 2018-03-20 RX ORDER — CLINDAMYCIN HYDROCHLORIDE 300 MG/1
300 CAPSULE ORAL EVERY 8 HOURS
Refills: 0 | COMMUNITY
Start: 2018-03-10 | End: 2018-03-28 | Stop reason: ALTCHOICE

## 2018-03-20 NOTE — PROGRESS NOTES
Office Visit    Patient Name: Ailin Brown    : 1955  MRN: [id number]      Assessment/Plan:  Ailin Brown is a 62 y.o. female who presents today for :    Visit for suture removal  -two stiches removed today  -wound incision is well-healed, no evidence of infection     Chronic atrial fibrillation  Coronary artery disease involving native coronary artery of native heart without angina pectoris  Essential hypertension  Long term (current) use of anticoagulants  Morbid obesity, unspecified obesity type  -continue anticoagulant therapy and medications. She reports planned TTE in the coming weeks with [place] Cardiologist  -f/u with Cardiology as needed    Pt will f/u with PCP in [place] for further care at this time.  Advised pt to bring PCP records with her when/if she desires to transfer care to us.       Follow-up for any evaluation as needed.     This note was created by combination of typed  and Dragon dictation.  Transcription errors may be present.  If there are any questions, please contact me.        ----------------------------------------------------------------------------------------------------------------------      HPI:  Ailin is a 62 y.o. female who presents today for:    Suture / Staple Removal        This patient has multiple medical diagnoses as noted below.    This patient is new to me - she has a complicated history of cardiac history, currently under the care of Dr. Harris  Patient is here today for  Suture / Staple Removal  from recent placement of pacemaker and implantable device in early March in [place]. She has a h/o Afib on dual anticoagulant therapy. She has cardiologists and PCP in [place], but also comes to [place] for work and sees Dr. Harris for cardiac care while in the area. She has f/u appt with him in two weeks. And will be returning to [place] in 45 days, as well as many future visits to f/u with her Cardiologist for ongoing care as she's a participant in  a heart study with at [facility] in [place].  She is currently on coumadin 5mg and ASA 81mg daily, and she goes to [facility] coumadin clinic for monitoring of her INR    Additional ROS    No F/C/wt changes/fatigue  No dysphagia/sore throat/rhinorrhea  No CP/SOB/palpitations/swelling  No cough/wheezing/SOB  No nausea/vomiting/abd pain/no diarrhea, no constipation, blood in stool  No muscle aches/joint pain   No rashes  No weakness/HA/tingling/numbness  No bleeding/bruising/swelling/lumps in axilla, groin, or neck        Patient Care Team:  Jun Harris MD as PCP - General (Cardiology)        Patient Active Problem List   Diagnosis    Vitreous detachment of right eye    Refractive error    DM type 2 without retinopathy    Essential hypertension    Pain of right eye    Chronic atrial fibrillation    Hyperlipidemia    Morbid obesity, unspecified obesity type    Coronary artery disease involving native coronary artery of native heart without angina pectoris    Long term (current) use of anticoagulants       Current Medications  Medications reviewed and updated.       Current Outpatient Prescriptions:     ACCU-CHEK MORENO PLUS TEST STRP Strp, TEST BLOOD GLUCOSE ONCE DAILY OR AS DIRECTED, Disp: , Rfl: 1    ACCU-CHEK COMPACT PLUS TEST Strp, USE ONCE D TO TEST BLOOD SUGAR, Disp: , Rfl: 3    albuterol (PROAIR HFA) 90 mcg/actuation inhaler, Inhale 2 puffs into the lungs every 6 (six) hours as needed for Wheezing. Rescue, Disp: , Rfl:     aspirin (ECOTRIN) 81 MG EC tablet, Take 1 tablet (81 mg total) by mouth once daily., Disp: 90 tablet, Rfl: 3    atenolol (TENORMIN) 25 MG tablet, TK 1 T PO D, Disp: , Rfl: 3    beclomethasone (QVAR) 40 mcg/actuation Aero, Inhale 2 puffs into the lungs., Disp: , Rfl:     carboxymethylcellulose (REFRESH PLUS) 0.5 % Dpet, 1 drop 3 (three) times daily as needed., Disp: , Rfl:     clindamycin (CLEOCIN) 300 MG capsule, Take 300 mg by mouth every 8  (eight) hours., Disp: , Rfl: 0    fenofibrate micronized 30 mg Cap, Take 30 mg by mouth once daily., Disp: , Rfl:     fexofenadine (ALLEGRA) 180 MG tablet, Take 180 mg by mouth., Disp: , Rfl:     fexofenadine-pseudoephedrine (ALLEGRA-D 24 HOUR) 180-240 mg per 24 hr tablet, Take 1 tablet by mouth once daily., Disp: , Rfl:     glimepiride (AMARYL) 2 MG tablet, TK 1 T PO D WITH XENA, Disp: , Rfl: 3    losartan (COZAAR) 25 MG tablet, TK 1 T PO BID, Disp: , Rfl: 3    warfarin (COUMADIN) 5 MG tablet, Take 1 tablet (5 mg total) by mouth Daily., Disp: 30 tablet, Rfl: 11    zolpidem (AMBIEN) 10 mg Tab, , Disp: , Rfl:     Past Surgical History:   Procedure Laterality Date    ATRIAL CARDIAC PACEMAKER INSERTION      CARDIAC PACEMAKER PLACEMENT      FOOT SURGERY         Family History   Problem Relation Age of Onset    Retinal detachment Mother     Blindness Brother     Amblyopia Brother     Macular degeneration Brother     Retinal detachment Brother     Glaucoma Father     Glaucoma Paternal Grandmother     Blindness Paternal Grandmother     Cataracts Neg Hx     Strabismus Neg Hx        Social History     Social History    Marital status:      Spouse name: N/A    Number of children: N/A    Years of education: N/A     Occupational History    Not on file.     Social History Main Topics    Smoking status: Never Smoker    Smokeless tobacco: Never Used    Alcohol use No    Drug use: No    Sexual activity: No     Other Topics Concern    Not on file     Social History Narrative    No narrative on file           Allergies   Review of patient's allergies indicates:   Allergen Reactions    Iodinated contrast- oral and iv dye Anaphylaxis    Shellfish containing products Anaphylaxis    Codeine Other (See Comments)    Sulfa (sulfonamide antibiotics) Other (See Comments)             Review of Systems  See HPI      Physical Exam  /64 (BP Location: Right arm, Patient Position: Sitting, BP Method:  "Large (Manual))   Pulse (!) 59   Temp 98.1 °F (36.7 °C) (Oral)   Ht 5' 4" (1.626 m)   Wt 103.9 kg (229 lb)   SpO2 97%   BMI 39.31 kg/m²     GEN: NAD, well developed, pleasant, well nourished  HEENT: NCAT, PERRLA, EOMI, sclera clear, anicteric, O/P clear, MMM with no lesions  NECK: normal, supple with midline trachea, no LAD, no thyromegaly  LUNGS: CTAB, no w/r/r, no increased work of breathing   HEART: RRR, normal S1 and S2, no m/r/g, no edema  ABD: s/nt/nd, NABS  SKIN: normal turgor, no rashes, well healed horizontal scar above L chest, no induration/redness/swelling.  PSYCH: AOx3, appropriate mood and affect  MSK: warm/well perfused, normal ROM in all 4 extremities, no c/c/e.      Labs  No results found for: LABA1C, HGBA1C  No results found for: NA, K, CL, CO2, BUN, CREATININE, CALCIUM, ANIONGAP, ESTGFRAFRICA, EGFRNONAA  No results found for: CHOL  No results found for: HDL  No results found for: LDLCALC  No results found for: TRIG  No results found for: CHOLHDL  Last set of blood work has been reviewed as noted above.      Health Maintenance  Health Maintenance       Date Due Completion Date    Hepatitis C Screening 1955 ---    Lipid Panel 1955 ---    Hemoglobin A1c 1955 ---    Foot Exam 10/05/1963 ---    TETANUS VACCINE 10/05/1971 ---    Pneumococcal PPSV23 (Medium Risk) (1) 10/05/1971 ---    High Dose Statin 10/05/1974 ---    Mammogram 10/05/1993 ---    Colonoscopy 10/05/2003 ---    Zoster Vaccine 10/05/2013 ---    Influenza Vaccine 08/01/2017 ---    Eye Exam 04/27/2018 4/27/2017                    "

## 2018-03-21 NOTE — PROGRESS NOTES
Ifrah/.( [phone]) ( [facility]) called today 3/21/18 to inform us that pt is having (Faisal/Anesthesia) on 5/10/18. Pt is stay on coumadin and be monitored. They need inr level fax to them, also pt need inr (1) day before procedure. The fax number is ( [phone]).Please advise.

## 2018-03-21 NOTE — PROGRESS NOTES
Noted and will fax INRs as requested. Patient seen by Carol PARK. I have reviewed her initial findings and agree with her assessment.  Care plan made together.

## 2018-03-26 ENCOUNTER — ANTI-COAG VISIT (OUTPATIENT)
Dept: CARDIOLOGY | Facility: CLINIC | Age: 63
End: 2018-03-26
Payer: MEDICARE

## 2018-03-26 DIAGNOSIS — Z79.01 LONG TERM (CURRENT) USE OF ANTICOAGULANTS: Primary | ICD-10-CM

## 2018-03-26 DIAGNOSIS — I48.20 CHRONIC ATRIAL FIBRILLATION: ICD-10-CM

## 2018-03-26 LAB — INR PPP: 2.2 (ref 2–3)

## 2018-03-26 PROCEDURE — 85610 PROTHROMBIN TIME: CPT | Mod: PBBFAC,PO

## 2018-03-26 NOTE — PROGRESS NOTES
Close monitoring for for watchman implant on 3/5. INR in therapeutic range today. Patient reports no bleeding, bruising or other changes. Will maintain current weekly dose until follow up in 10 days. Advised patient to call with any changes or concerns. Patient reports they changed her ARIELA procedure date and she does not have an exact date yet, will call when they reschedule.

## 2018-03-26 NOTE — PROGRESS NOTES
Patient seen by Carol PARK. I have reviewed her initial findings and agree with her assessment.  Care plan made together.

## 2018-03-27 NOTE — PROGRESS NOTES
Patient called to report that her procedure ( ARIELA/DCCV) has been moved from 5/10 to 5/16, it will be done in [place], next INR is due 4/04

## 2018-03-28 ENCOUNTER — OFFICE VISIT (OUTPATIENT)
Dept: CARDIOLOGY | Facility: CLINIC | Age: 63
End: 2018-03-28
Payer: MEDICARE

## 2018-03-28 VITALS
HEART RATE: 64 BPM | DIASTOLIC BLOOD PRESSURE: 79 MMHG | HEIGHT: 64 IN | WEIGHT: 231.5 LBS | BODY MASS INDEX: 39.52 KG/M2 | SYSTOLIC BLOOD PRESSURE: 142 MMHG | RESPIRATION RATE: 15 BRPM | OXYGEN SATURATION: 98 %

## 2018-03-28 DIAGNOSIS — E78.2 MIXED HYPERLIPIDEMIA: ICD-10-CM

## 2018-03-28 DIAGNOSIS — E11.9 DM TYPE 2 WITHOUT RETINOPATHY: ICD-10-CM

## 2018-03-28 DIAGNOSIS — I10 ESSENTIAL HYPERTENSION: ICD-10-CM

## 2018-03-28 DIAGNOSIS — I48.0 PAROXYSMAL ATRIAL FIBRILLATION: Primary | ICD-10-CM

## 2018-03-28 DIAGNOSIS — E66.01 MORBID OBESITY, UNSPECIFIED OBESITY TYPE: ICD-10-CM

## 2018-03-28 PROBLEM — I25.10 CORONARY ARTERY DISEASE INVOLVING NATIVE CORONARY ARTERY OF NATIVE HEART WITHOUT ANGINA PECTORIS: Status: RESOLVED | Noted: 2018-02-14 | Resolved: 2018-03-28

## 2018-03-28 PROCEDURE — 99215 OFFICE O/P EST HI 40 MIN: CPT | Mod: S$PBB,,, | Performed by: INTERNAL MEDICINE

## 2018-03-28 PROCEDURE — 99999 PR PBB SHADOW E&M-EST. PATIENT-LVL III: CPT | Mod: PBBFAC,,, | Performed by: INTERNAL MEDICINE

## 2018-03-28 PROCEDURE — 93010 ELECTROCARDIOGRAM REPORT: CPT | Mod: S$PBB,,, | Performed by: INTERNAL MEDICINE

## 2018-03-28 PROCEDURE — 93005 ELECTROCARDIOGRAM TRACING: CPT | Mod: PBBFAC | Performed by: INTERNAL MEDICINE

## 2018-03-28 PROCEDURE — 99213 OFFICE O/P EST LOW 20 MIN: CPT | Mod: PBBFAC | Performed by: INTERNAL MEDICINE

## 2018-03-28 RX ORDER — ASPIRIN 325 MG
325 TABLET ORAL 2 TIMES DAILY
COMMUNITY

## 2018-03-28 NOTE — PROGRESS NOTES
CARDIOVASCULAR PROGRESS NOTE    REASON FOR CONSULT:   Ailin Brown is a 62 y.o. female who presents for f/u AF, Watchman implant 3/5/18.    PCP: Angelica ([place]) [phone]  Card: Lindsay ([place]) [phone]  EP: Atruro ([place]) [phone]  HISTORY OF PRESENT ILLNESS:   The patient returns for follow-up.  In the interim since her last visit, she went to [place] and had her pacemaker generator changed out, with a right atrial lead revision.  She now has a [employer] pacemaker in place.  She also underwent watchman left atrial appendage occluder implant.  She continues to take warfarin for a goal INR 2.0-3.0.  She plans to follow-up in [place] in mid May for repeat ARIELA.  I've asked her to obtain specific instructions from her [place] cardiologist in regards to the need to continue anticoagulation, or switch over to aspirin/Plavix.  She denies any intercurrent angina or dyspnea.  She does report occasional episodic lightheadedness and palpitations.  She denies PND, orthopnea, or lower extremity edema.  There's been no melena, hematuria, or claudicant symptoms.    Extensive records from the patient's [place] cardiologists were reviewed.  These note:  PAF, CHADS VASC 6, intolerant of warfarin, and unable to afford NOAC.  History of VT 2006 status post multiple ablations.  History of Lyme disease without proven cardiac involvement.  TIA October 2006.  SSS status post pacemaker placement December 2006.  Stress echocardiogram 2010 negative for ischemia EF 60%.  Paroxysmal atrial fibrillation noted on pacemaker checks March 2012.      CARDIOVASCULAR HISTORY:   PAF, intol of coumadin, s/p Watchman implant 3/5/18 at [facility]  SSS s/p [employer]acio PPM ([employer]urity, implanted 3/5/18 at [facility])  hx CVA/TIA 10/2006  Hx VT s/p ablation 2006      PAST MEDICAL HISTORY:     Past Medical History:   Diagnosis Date    Asthma     Cataract     Diabetes mellitus     Hypertension        PAST SURGICAL HISTORY:      Past Surgical History:   Procedure Laterality Date    ATRIAL CARDIAC PACEMAKER INSERTION      CARDIAC PACEMAKER PLACEMENT      FOOT SURGERY         ALLERGIES AND MEDICATION:     Review of patient's allergies indicates:   Allergen Reactions    Shellfish containing products Anaphylaxis     Previous Medications    ACCU-CHEK MORENO PLUS TEST STRP STRP    TEST BLOOD GLUCOSE ONCE DAILY OR AS DIRECTED    ALBUTEROL (PROAIR HFA) 90 MCG/ACTUATION INHALER    Inhale 2 puffs into the lungs every 6 (six) hours as needed for Wheezing. Rescue    ASPIRIN 325 MG TABLET    Take 325 mg by mouth once daily.    ATENOLOL (TENORMIN) 25 MG TABLET    TK 1 T PO D    BECLOMETHASONE (QVAR) 40 MCG/ACTUATION AERO    Inhale 2 puffs into the lungs.    CARBOXYMETHYLCELLULOSE (REFRESH PLUS) 0.5 % DPET    1 drop 3 (three) times daily as needed.    FENOFIBRATE MICRONIZED 30 MG CAP    Take 30 mg by mouth once daily.    FEXOFENADINE (ALLEGRA) 180 MG TABLET    Take 180 mg by mouth.    GLIMEPIRIDE (AMARYL) 2 MG TABLET    TK 1 T PO D WITH XENA    LOSARTAN (COZAAR) 25 MG TABLET    TK 1 T PO BID    WARFARIN (COUMADIN) 5 MG TABLET    Take 1 tablet (5 mg total) by mouth Daily.    ZOLPIDEM (AMBIEN) 10 MG TAB           SOCIAL HISTORY:     Social History     Social History    Marital status:      Spouse name: N/A    Number of children: N/A    Years of education: N/A     Occupational History    Not on file.     Social History Main Topics    Smoking status: Never Smoker    Smokeless tobacco: Never Used    Alcohol use No    Drug use: No    Sexual activity: No     Other Topics Concern    Not on file     Social History Narrative    No narrative on file       FAMILY HISTORY:     Family History   Problem Relation Age of Onset    Retinal detachment Mother     Blindness Brother     Amblyopia Brother     Macular degeneration Brother     Retinal detachment Brother     Glaucoma Father     Glaucoma Paternal Grandmother     Blindness Paternal  "Grandmother     Cataracts Neg Hx     Strabismus Neg Hx        REVIEW OF SYSTEMS:   Review of Systems   Constitutional: Negative for chills, diaphoresis and fever.   HENT: Negative for nosebleeds.    Eyes: Negative for blurred vision, double vision and photophobia.   Respiratory: Negative for hemoptysis, shortness of breath and wheezing.    Cardiovascular: Positive for palpitations. Negative for chest pain, orthopnea, claudication, leg swelling and PND.   Gastrointestinal: Negative for abdominal pain, blood in stool, heartburn, melena, nausea and vomiting.   Genitourinary: Negative for flank pain and hematuria.   Musculoskeletal: Negative for falls, myalgias and neck pain.   Skin: Negative for rash.   Neurological: Positive for dizziness. Negative for seizures, loss of consciousness, weakness and headaches.   Endo/Heme/Allergies: Negative for polydipsia. Does not bruise/bleed easily.   Psychiatric/Behavioral: Negative for depression and memory loss. The patient is not nervous/anxious.        PHYSICAL EXAM:     Vitals:    03/28/18 1021   BP: (!) 142/79   Pulse: 64   Resp: 15    Body mass index is 39.73 kg/m².  Weight: 105 kg (231 lb 7.7 oz)   Height: 5' 4" (162.6 cm)     Physical Exam   Constitutional: She is oriented to person, place, and time. She appears well-developed and well-nourished. She is cooperative.  Non-toxic appearance. No distress.   HENT:   Head: Normocephalic and atraumatic.   Eyes: Conjunctivae and EOM are normal. Pupils are equal, round, and reactive to light. No scleral icterus.   Neck: Trachea normal. Neck supple. Normal carotid pulses and no JVD present. Carotid bruit is not present. No neck rigidity. No edema present. No thyromegaly present.   Cardiovascular: Normal rate, regular rhythm, S1 normal and S2 normal.  PMI is not displaced.  Exam reveals distant heart sounds. Exam reveals no gallop and no friction rub.    No murmur heard.  Pulses:       Carotid pulses are 2+ on the right side, and " 2+ on the left side.  L sided PPM scar well healed.   Pulmonary/Chest: Effort normal and breath sounds normal. No respiratory distress. She has no wheezes. She has no rales. She exhibits no tenderness.   Abdominal: Soft. Bowel sounds are normal. She exhibits no distension. There is no hepatosplenomegaly.   obese   Musculoskeletal: She exhibits no edema or tenderness.   Feet:   Right Foot:   Skin Integrity: Negative for ulcer.   Left Foot:   Skin Integrity: Negative for ulcer.   Neurological: She is alert and oriented to person, place, and time. No cranial nerve deficit.   Skin: Skin is warm and dry. No rash noted. No erythema.   Psychiatric: She has a normal mood and affect. Her speech is normal and behavior is normal.   Vitals reviewed.      DATA:   EKG: (personally reviewed tracing)  3/28/18 AP 60, VS, low voltage    Laboratory:  CBC:        CHEMISTRIES:        CARDIAC BIOMARKERS:        COAGS:    Recent Labs  Lab 02/28/18  1156 03/19/18  1150 03/26/18  1327   INR 2.1 2.6 2.2       LIPIDS/LFTS:        Labs dated March 1, 2017  Total cholesterol 171  Eduin was 94  HDL 64  LDL 88  ALT 13    Records from the patient's [place] cardiologists ([facility]).  These note:  PAF, CHADS VASC 6, intolerant of warfarin, and unable to afford NOAC.  History of VT 2006 status post multiple ablations.  History of Lyme disease without proven cardiac involvement.  TIA October 2006.  SSS status post pacemaker placement December 2006.  Stress echocardiogram 2010 negative for ischemia EF 60%.  Paroxysmal atrial fibrillation noted on pacemaker checks March 2012.    Cardiovascular Testing:  As above  ARIELA from [facility] 5/2018 to be brought by pt to next OV    ASSESSMENT:   # ?hx CAD/MI  # PAF intol of warfarin s/p Watchman implant 3/5/18 at [facility] ([place]), continuing coumadin for now  # SSS s/p PPM ([employer]ifacio) gen changed 3/5/18  # Hx VT s/p ablation 2006  # HTN, mildly uncontrolled  # DM  # ?elev TG, prev intol of statins  # BMI 40, up 1 unit vs  last OV  # hx CVA    PLAN:   Cont coumadin (dosing per clinic) until instructed by [facility] to switch to ASA/Plavix  ARIELA planned in May 2018 for f/u of Yuri at [facility]  Pt to bring ARIELA report from [facility] to her f/u OV  RTC 2 months  Eventual STJ PPM f/u in our office    Jun Harris MD, FACC

## 2018-04-04 ENCOUNTER — ANTI-COAG VISIT (OUTPATIENT)
Dept: CARDIOLOGY | Facility: CLINIC | Age: 63
End: 2018-04-04
Payer: MEDICARE

## 2018-04-04 DIAGNOSIS — I48.0 PAROXYSMAL ATRIAL FIBRILLATION: ICD-10-CM

## 2018-04-04 DIAGNOSIS — Z79.01 LONG TERM (CURRENT) USE OF ANTICOAGULANTS: Primary | ICD-10-CM

## 2018-04-04 LAB — INR PPP: 2.3 (ref 2–3)

## 2018-04-04 PROCEDURE — 85610 PROTHROMBIN TIME: CPT | Mod: PBBFAC,PO

## 2018-04-04 NOTE — PROGRESS NOTES
Close monitoring for ARIELA on 5/16. INR on therapeutic range today. Patient reports she has been taking tylenol for hip pain. No bleeding. Will maintain current weekly dose until follow up in 1 week for close monitoring. Advised patient to call with any changes or concerns.

## 2018-04-09 ENCOUNTER — ANTI-COAG VISIT (OUTPATIENT)
Dept: CARDIOLOGY | Facility: CLINIC | Age: 63
End: 2018-04-09
Payer: MEDICARE

## 2018-04-09 DIAGNOSIS — I48.0 PAROXYSMAL ATRIAL FIBRILLATION: ICD-10-CM

## 2018-04-09 DIAGNOSIS — Z79.01 LONG TERM (CURRENT) USE OF ANTICOAGULANTS: Primary | ICD-10-CM

## 2018-04-09 LAB — INR PPP: 2.2 (ref 2–3)

## 2018-04-09 PROCEDURE — 85610 PROTHROMBIN TIME: CPT | Mod: PBBFAC,PO

## 2018-04-09 NOTE — PROGRESS NOTES
Close monitoring for ARIELA on 5/16. INR in therapeutic range today. Patient reports no bleeding, bruising or other changes. Will maintain current weekly dose until follow up in 1 week for close monitoring. Advised patient to call with any changes or concerns.

## 2018-04-18 ENCOUNTER — ANTI-COAG VISIT (OUTPATIENT)
Dept: CARDIOLOGY | Facility: CLINIC | Age: 63
End: 2018-04-18
Payer: MEDICARE

## 2018-04-18 DIAGNOSIS — I48.0 PAROXYSMAL ATRIAL FIBRILLATION: ICD-10-CM

## 2018-04-18 DIAGNOSIS — Z79.01 LONG TERM (CURRENT) USE OF ANTICOAGULANTS: Primary | ICD-10-CM

## 2018-04-18 LAB — INR PPP: 2.2 (ref 2–3)

## 2018-04-18 PROCEDURE — 85610 PROTHROMBIN TIME: CPT | Mod: PBBFAC,PO

## 2018-04-25 ENCOUNTER — ANTI-COAG VISIT (OUTPATIENT)
Dept: CARDIOLOGY | Facility: CLINIC | Age: 63
End: 2018-04-25
Payer: MEDICARE

## 2018-04-25 DIAGNOSIS — Z79.01 LONG TERM (CURRENT) USE OF ANTICOAGULANTS: Primary | ICD-10-CM

## 2018-04-25 DIAGNOSIS — I48.0 PAROXYSMAL ATRIAL FIBRILLATION: ICD-10-CM

## 2018-04-25 LAB — INR PPP: 1.8 (ref 2–3)

## 2018-04-25 PROCEDURE — 85610 PROTHROMBIN TIME: CPT | Mod: PBBFAC,PO

## 2018-04-25 PROCEDURE — 99211 OFF/OP EST MAY X REQ PHY/QHP: CPT | Mod: S$PBB,25,,

## 2018-05-02 ENCOUNTER — ANTI-COAG VISIT (OUTPATIENT)
Dept: CARDIOLOGY | Facility: CLINIC | Age: 63
End: 2018-05-02
Payer: MEDICARE

## 2018-05-02 DIAGNOSIS — Z79.01 LONG TERM (CURRENT) USE OF ANTICOAGULANTS: Primary | ICD-10-CM

## 2018-05-02 DIAGNOSIS — I48.0 PAROXYSMAL ATRIAL FIBRILLATION: ICD-10-CM

## 2018-05-02 LAB — INR PPP: 2.2 (ref 2–3)

## 2018-05-02 PROCEDURE — 85610 PROTHROMBIN TIME: CPT | Mod: PBBFAC,PO

## 2018-05-09 ENCOUNTER — ANTI-COAG VISIT (OUTPATIENT)
Dept: CARDIOLOGY | Facility: CLINIC | Age: 63
End: 2018-05-09
Payer: MEDICARE

## 2018-05-09 DIAGNOSIS — Z79.01 LONG TERM (CURRENT) USE OF ANTICOAGULANTS: Primary | ICD-10-CM

## 2018-05-09 DIAGNOSIS — I48.0 PAROXYSMAL ATRIAL FIBRILLATION: ICD-10-CM

## 2018-05-09 LAB — INR PPP: 1.7 (ref 2–3)

## 2018-05-09 PROCEDURE — 85610 PROTHROMBIN TIME: CPT | Mod: PBBFAC,PO

## 2018-05-09 NOTE — PROGRESS NOTES
Close monitoring for ARIELA on 5/16. INR low today. Patient reports a increase in vitamin k and does not want to continue this new diet. Patient reports she is leaving on 5/11 for [place] to have ARIELA. Reports she should be stopping coumadin and starting plavix on 5/16. Advised patient if she resumes coumadin to call the coumadin clinic. Will follow up with patient on 5/16. Will increase weekly dose until follow up in 1 week. Advised patient to call with any changes or concerns.

## 2018-05-17 ENCOUNTER — ANTI-COAG VISIT (OUTPATIENT)
Dept: CARDIOLOGY | Facility: CLINIC | Age: 63
End: 2018-05-17

## 2018-05-17 DIAGNOSIS — Z79.01 LONG TERM (CURRENT) USE OF ANTICOAGULANTS: ICD-10-CM

## 2018-05-17 DIAGNOSIS — I48.0 PAROXYSMAL ATRIAL FIBRILLATION: ICD-10-CM

## 2018-05-17 NOTE — PROGRESS NOTES
Contacted patient. She is in [place] and reports ARIELA was ok. She will be transitioning to plavix and off of coumadin. We will sign off and d/c patient. She was advised to call if something changes and needs to resume coumadin.

## 2018-06-28 ENCOUNTER — HOSPITAL ENCOUNTER (EMERGENCY)
Facility: HOSPITAL | Age: 63
Discharge: HOME | End: 2018-06-28
Attending: EMERGENCY MEDICINE
Payer: MEDICARE

## 2018-06-28 VITALS
RESPIRATION RATE: 18 BRPM | OXYGEN SATURATION: 98 % | DIASTOLIC BLOOD PRESSURE: 79 MMHG | BODY MASS INDEX: 39.14 KG/M2 | TEMPERATURE: 99 F | HEART RATE: 60 BPM | SYSTOLIC BLOOD PRESSURE: 172 MMHG | WEIGHT: 228 LBS

## 2018-06-28 DIAGNOSIS — M79.669 PAIN AND SWELLING OF LOWER LEG: ICD-10-CM

## 2018-06-28 DIAGNOSIS — M79.89 PAIN AND SWELLING OF LOWER LEG: ICD-10-CM

## 2018-06-28 DIAGNOSIS — M25.552 LEFT HIP PAIN: Primary | ICD-10-CM

## 2018-06-28 LAB
ALBUMIN SERPL BCP-MCNC: 3.9 G/DL
ALP SERPL-CCNC: 73 U/L
ALT SERPL W/O P-5'-P-CCNC: 21 U/L
ANION GAP SERPL CALC-SCNC: 10 MMOL/L
ANISOCYTOSIS BLD QL SMEAR: SLIGHT
AST SERPL-CCNC: 28 U/L
BASOPHILS # BLD AUTO: 0.04 K/UL
BASOPHILS NFR BLD: 0.8 %
BILIRUB SERPL-MCNC: 0.6 MG/DL
BILIRUB UR QL STRIP: NEGATIVE
BNP SERPL-MCNC: 82 PG/ML
BUN SERPL-MCNC: 16 MG/DL
CALCIUM SERPL-MCNC: 10 MG/DL
CHLORIDE SERPL-SCNC: 105 MMOL/L
CK SERPL-CCNC: 84 U/L
CLARITY UR REFRACT.AUTO: CLEAR
CO2 SERPL-SCNC: 23 MMOL/L
COLOR UR AUTO: NORMAL
CREAT SERPL-MCNC: 0.9 MG/DL
DIFFERENTIAL METHOD: NORMAL
EOSINOPHIL # BLD AUTO: 0.2 K/UL
EOSINOPHIL NFR BLD: 3.1 %
ERYTHROCYTE [DISTWIDTH] IN BLOOD BY AUTOMATED COUNT: 12.3 %
EST. GFR  (AFRICAN AMERICAN): >60 ML/MIN/1.73 M^2
EST. GFR  (NON AFRICAN AMERICAN): >60 ML/MIN/1.73 M^2
GLUCOSE SERPL-MCNC: 98 MG/DL
GLUCOSE UR QL STRIP: NEGATIVE
HCT VFR BLD AUTO: 40.6 %
HGB BLD-MCNC: 13.4 G/DL
HGB UR QL STRIP: NEGATIVE
IMM GRANULOCYTES # BLD AUTO: 0.02 K/UL
IMM GRANULOCYTES NFR BLD AUTO: 0.4 %
INR PPP: 1.1
KETONES UR QL STRIP: NEGATIVE
LEUKOCYTE ESTERASE UR QL STRIP: NEGATIVE
LYMPHOCYTES # BLD AUTO: 1.6 K/UL
LYMPHOCYTES NFR BLD: 31.4 %
MCH RBC QN AUTO: 30.9 PG
MCHC RBC AUTO-ENTMCNC: 33 G/DL
MCV RBC AUTO: 94 FL
MONOCYTES # BLD AUTO: 0.5 K/UL
MONOCYTES NFR BLD: 9.6 %
NEUTROPHILS # BLD AUTO: 2.9 K/UL
NEUTROPHILS NFR BLD: 54.7 %
NITRITE UR QL STRIP: NEGATIVE
NRBC BLD-RTO: 0 /100 WBC
PH UR STRIP: 7 [PH] (ref 5–8)
PLATELET # BLD AUTO: 168 K/UL
PLATELET BLD QL SMEAR: NORMAL
PMV BLD AUTO: 10.4 FL
POTASSIUM SERPL-SCNC: 4.5 MMOL/L
PROT SERPL-MCNC: 7.8 G/DL
PROT UR QL STRIP: NEGATIVE
PROTHROMBIN TIME: 11.3 SEC
RBC # BLD AUTO: 4.34 M/UL
SODIUM SERPL-SCNC: 138 MMOL/L
SP GR UR STRIP: 1 (ref 1–1.03)
TROPONIN I SERPL DL<=0.01 NG/ML-MCNC: <0.006 NG/ML
URN SPEC COLLECT METH UR: NORMAL
UROBILINOGEN UR STRIP-ACNC: NEGATIVE EU/DL
WBC # BLD AUTO: 5.22 K/UL

## 2018-06-28 PROCEDURE — 85610 PROTHROMBIN TIME: CPT

## 2018-06-28 PROCEDURE — 84484 ASSAY OF TROPONIN QUANT: CPT

## 2018-06-28 PROCEDURE — 80053 COMPREHEN METABOLIC PANEL: CPT

## 2018-06-28 PROCEDURE — 85025 COMPLETE CBC W/AUTO DIFF WBC: CPT

## 2018-06-28 PROCEDURE — 81003 URINALYSIS AUTO W/O SCOPE: CPT

## 2018-06-28 PROCEDURE — 82550 ASSAY OF CK (CPK): CPT

## 2018-06-28 PROCEDURE — 99284 EMERGENCY DEPT VISIT MOD MDM: CPT | Mod: 25

## 2018-06-28 PROCEDURE — 99284 EMERGENCY DEPT VISIT MOD MDM: CPT | Mod: ,,, | Performed by: PHYSICIAN ASSISTANT

## 2018-06-28 PROCEDURE — 93010 ELECTROCARDIOGRAM REPORT: CPT | Mod: ,,, | Performed by: INTERNAL MEDICINE

## 2018-06-28 PROCEDURE — 83880 ASSAY OF NATRIURETIC PEPTIDE: CPT

## 2018-06-28 RX ORDER — CLOPIDOGREL BISULFATE 75 MG/1
75 TABLET ORAL DAILY
COMMUNITY
End: 2019-02-04

## 2018-06-28 NOTE — ED NOTES
""I'm taking Plavix. I'm in the process of moving. I was really pale. My feet were really swollen, I couldn't get my shoes on". Pt reports dizziness, being pale, swelling in feet, and pain to both legs. Pt began taking Plavix March 2018. Hx of permanent pacemaker. Pt also states he has been confused lately and was walking in circles around the hospital trying to find the ER.   "

## 2018-06-28 NOTE — DISCHARGE INSTRUCTIONS
Please follow-up with Dr. Olea to establish yourself with Cardiology here at [facility]. You may also use the Internal medicine contact information below to establish yourself with a primary care provider here additionally. Please return to the emergency room for new, worsening, or concerning symptoms.     Our goal in the emergency department is to always give you outstanding care and exceptional service. You may receive a survey by mail or e-mail in the next week regarding your experience in our ED. We would greatly appreciate your completing and returning the survey. Your feedback provides us with a way to recognize our staff who give very good care and it helps us learn how to improve when your experience was below our aspiration of excellence.

## 2018-06-28 NOTE — ED PROVIDER NOTES
Encounter Date: 6/28/2018       History     Chief Complaint   Patient presents with    Leg Swelling     Thinks she is having reaction to plavix. Reports bilateral leg pain and swelling since last night.      62 year old female with medical history of PAF, SSS s/p PPM and [place], Hx of CVA/TIA 2006, DM, HTN presenting to the ED with the chief complaint of leg swelling. Patient has a notepad with her with several additional symptoms that have been going on for several months. Patient primary concern today his left hip pain and bilateral leg swelling. She reports she recently had a PPM and Miles placed in 3/2018 in [place]. She has been on chronic Coumadin therapy and recently initiated Plavix 5 weeks ago. She reports having bilateral leg swelling that has been worsening for the past 4 weeks. She has associated lower extremity muscle aches that she describes as intermittent cramping sensations. She is concerned the lower extremity swelling and muscle cramps are a side effect of Plavix. She states she recently moved to [facility] and is in the process of moving furniture. She reports she is clumsy and has been bumping into furniture. She reports falling 2 days ago on her left side and injuring her left hip. She denies head trauma or LOC. She additionally reports feeling confused lately and more stressed out from the move. She states she had difficulty finding her way to the Emergency Room today despite using a GPS. She denies recent air travel or sedentary lifestyle. She does not fly 2/2 to [place] and last extended drive was 4 weeks ago. She denies fever, chest pain, SOB, vomiting, dysuria, hematuria, diarrhea, constipation, lightheadedness, headache. She uses a cane PRN for ambulation assistance. Patient called her cardiologist from [place] last night and was instructed to come to the ED today for evaluation.           Review of patient's allergies indicates:   Allergen Reactions    Iodinated contrast- oral and iv dye  Anaphylaxis    Shellfish containing products Anaphylaxis    Codeine Other (See Comments)    Sulfa (sulfonamide antibiotics) Other (See Comments)     Past Medical History:   Diagnosis Date    Asthma     Cataract     Diabetes mellitus     Hypertension      Past Surgical History:   Procedure Laterality Date    ATRIAL CARDIAC PACEMAKER INSERTION      CARDIAC PACEMAKER PLACEMENT      FOOT SURGERY       Family History   Problem Relation Age of Onset    Retinal detachment Mother     Blindness Brother     Amblyopia Brother     Macular degeneration Brother     Retinal detachment Brother     Glaucoma Father     Glaucoma Paternal Grandmother     Blindness Paternal Grandmother     Cataracts Neg Hx     Strabismus Neg Hx      Social History   Substance Use Topics    Smoking status: Never Smoker    Smokeless tobacco: Never Used    Alcohol use No     Review of Systems   Constitutional: Negative for chills and fever.   HENT: Negative for congestion, sore throat and trouble swallowing.    Eyes: Negative for visual disturbance.   Respiratory: Negative for cough and shortness of breath.    Cardiovascular: Positive for leg swelling. Negative for chest pain.   Gastrointestinal: Negative for abdominal pain, diarrhea, nausea and vomiting.   Genitourinary: Negative for dysuria and hematuria.   Musculoskeletal: Positive for arthralgias. Negative for back pain, neck pain and neck stiffness.   Skin: Negative for wound.   Neurological: Negative for light-headedness and headaches.       Physical Exam     Initial Vitals [06/28/18 1231]   BP Pulse Resp Temp SpO2   (!) 172/79 60 18 98.5 °F (36.9 °C) 98 %      MAP       --         Physical Exam    Constitutional: She appears well-developed and well-nourished. She is not diaphoretic. No distress.   HENT:   Head: Normocephalic and atraumatic.   Mouth/Throat: Oropharynx is clear and moist. No oropharyngeal exudate.   Eyes: EOM are normal. Pupils are equal, round, and reactive to  light.   Neck: Normal range of motion. Neck supple.   Cardiovascular: Normal rate, regular rhythm and intact distal pulses.   +1 BLE non-pitting edema   Pulmonary/Chest: Breath sounds normal. No respiratory distress. She has no wheezes.   Abdominal: Soft. Bowel sounds are normal. She exhibits no distension. There is no tenderness.   Musculoskeletal: Normal range of motion. She exhibits no edema.   TTP L trochanteric bursa. No ROM deficits.    Neurological: She is alert and oriented to person, place, and time. She has normal strength.   Skin: Skin is warm and dry. No erythema.          ED Course   Procedures  Labs Reviewed   CBC W/ AUTO DIFFERENTIAL   COMPREHENSIVE METABOLIC PANEL   TROPONIN I   URINALYSIS, REFLEX TO URINE CULTURE    Narrative:     Preferred Collection Type->Urine, Clean Catch   B-TYPE NATRIURETIC PEPTIDE   PROTIME-INR   CK          Imaging Results          X-Ray Chest PA And Lateral (Final result)  Result time 06/28/18 14:41:45    Final result by Maliha Wade MD (06/28/18 14:41:45)                 Narrative:    EXAMINATION:  XR CHEST PA AND LATERAL    CLINICAL HISTORY:  Pain in unspecified lower leg    TECHNIQUE:  PA and lateral views of the chest were performed.    COMPARISON:  Please see above.    FINDINGS:  X-ray beam attenuation and scatter occur in generous overlying soft tissues.  Soft tissues of the patient's arms project over lateral view obscuring some detail the retrosternal airspace and mediastinal margins.    Pacer in the left anterior chest wall obscures some detail the lateral aspect of the left mid lung zone on PA view.  Three leads extend from this device.  Two end at the level of the right atrium.  The 3rd extends to the apex of the right ventricle.    Mediastinal structures are midline.  Calcific plaque is present at the level of the arch.  Cardiac silhouette and pulmonary vascular distribution are normal.    I detect no pulmonary disease, pleural fluid, lymph node  enlargement, cardiac decompensation, pneumothorax, pneumomediastinum or pneumoperitoneum.    Multilevel degenerative changes are present in the thoracic spine.      Electronically signed by: Maliha Wade MD  Date:    06/28/2018  Time:    14:41                             X-Ray Hip 2 View Left (Final result)  Result time 06/28/18 14:44:26    Final result by Maliha Wade MD (06/28/18 14:44:26)                 Impression:      Please see above.      Electronically signed by: Maliha Wade MD  Date:    06/28/2018  Time:    14:44             Narrative:    EXAMINATION:  XR HIP 2 VIEW LEFT    CLINICAL HISTORY:  Pain in left hip    TECHNIQUE:  AP view of the pelvis and frog leg lateral view of the left hip were performed.    COMPARISON:  None.    FINDINGS:  AP view of the pelvis with hips in neutral position plus frog-leg lateral view of the left hip are provided for review.  Please note that on AP view the most lateral margin of the left greater trochanter is incompletely visualized; repeat AP view of the left hip could be performed if clinically warranted at no additional charge to the patient.    Femoral heads maintain rounded contours and are retained within the acetabula.  There is slight loss of joint space in the superior aspect of each acetabulum.    There is sclerosis at the pubic symphysis compatible with remote osteitis pubis.  Sacroiliac joints are patent in their imaged extents with vacuum phenomenon.  Degenerative changes are present in the distal lumbar spine and at the lumbosacral junction.    I detect no fracture, dislocation or radiopaque retained foreign body.    However variety of processes that cause hip and pelvic pain can be occult on radiographs.  If the patient has difficulty bearing weight or there is persistent concern for radiographically occult fracture, I recommend CT with bone technique for more sensitive assessment.  If there is persistent concern for soft tissue or marrow  abnormality, I recommend MRI.                                       APC / Resident Notes:   62 year old female with medical history of PAF, SSS s/p PPM and [place], Hx of CVA/TIA 2006, DM, HTN presenting to the ED c/o BLE swelling and left hip pain. DDx includes but not limited to bursitis, osteoarthritis, muscle strain, fracture, CHF exacerbation, nephrotic syndrome, hypoalbuminemia, rhabdomyolysis, medication side effect. I have considered but do not suspect DVT, arterial occlusion, septic arthritis. Will get labs, CXR, L hip x-ray.     Work-up shows unremarkable CBC, CMP, Trop, BNP, CPK, UA. CXR without evidence of pulmonary disease. X-ray left hip negative for fracture. Patient able to bear weight and ambulate without assistance on reassessment. Do not suspect emergent process at this time. Instructed patient to follow-up with her cardiologist and PCP. Patient given contact information for PCP establishment at [facility]. Return to ED precautions given for new, worsening, or concerning symptoms. I have discussed the care of this patient with my supervising physician.                  Clinical Impression:   The primary encounter diagnosis was Left hip pain. A diagnosis of Pain and swelling of lower leg was also pertinent to this visit.      Disposition:   Disposition: Discharged  Condition: Stable                        Luis Gillis PA-C  06/28/18 5399       Luis Gillis PA-C  06/28/18 2883

## 2018-07-16 ENCOUNTER — INITIAL CONSULT (OUTPATIENT)
Dept: CARDIOLOGY | Facility: CLINIC | Age: 63
End: 2018-07-16
Payer: MEDICARE

## 2018-07-16 ENCOUNTER — TELEPHONE (OUTPATIENT)
Dept: ELECTROPHYSIOLOGY | Facility: CLINIC | Age: 63
End: 2018-07-16

## 2018-07-16 VITALS
SYSTOLIC BLOOD PRESSURE: 124 MMHG | HEART RATE: 60 BPM | DIASTOLIC BLOOD PRESSURE: 80 MMHG | OXYGEN SATURATION: 98 % | HEIGHT: 64 IN | BODY MASS INDEX: 39.75 KG/M2 | WEIGHT: 232.81 LBS

## 2018-07-16 DIAGNOSIS — I48.91 ATRIAL FIBRILLATION, UNSPECIFIED TYPE: Primary | ICD-10-CM

## 2018-07-16 DIAGNOSIS — Z95.0 CARDIAC PACEMAKER IN SITU: ICD-10-CM

## 2018-07-16 PROCEDURE — 99214 OFFICE O/P EST MOD 30 MIN: CPT | Mod: S$PBB,,, | Performed by: INTERNAL MEDICINE

## 2018-07-16 PROCEDURE — 99214 OFFICE O/P EST MOD 30 MIN: CPT | Mod: PBBFAC | Performed by: INTERNAL MEDICINE

## 2018-07-16 PROCEDURE — 99999 PR PBB SHADOW E&M-EST. PATIENT-LVL IV: CPT | Mod: PBBFAC,,, | Performed by: INTERNAL MEDICINE

## 2018-07-16 RX ORDER — EPINEPHRINE 0.15 MG/.3ML
0.15 INJECTION INTRAMUSCULAR
COMMUNITY

## 2018-07-16 NOTE — PROGRESS NOTES
CARDIOVASCULAR PROGRESS NOTE    REASON FOR CONSULT:   Ailin Brown is a 62 y.o. female who presents for f/u AF, Watchman implant 3/5/18.    PCP: Angelica ([place]) [phone]  Card: Lindsay ([place]) [phone] and Kimberly BHATT  EP: Arturo ([place]) [phone]  HISTORY OF PRESENT ILLNESS:   The patient comes for follow-up.  In the interim since her last visit, she went to [place] and had her pacemaker generator changed out, with a right atrial lead revision.  She now has a [employer] pacemaker in place.  She also underwent watchman left atrial appendage occluder implant.  She continues to take warfarin for a goal INR 2.0-3.0.  She plans to followed-up in [place] in mid May for repeat ARIELA.  After this they stopped coumadin and placed her on DAP Rx.  She has no records available.    She told me she has WPW s/p nine ablations now in NSR, PAF, PPM with lead replacement recently as above, Watchman device March 5 2018.  She is dating Car Robbins's brother, she is an , and she recently moved to [place].  She saw Dr. Kimberly BHATT once but decided to have her Watchman and PPM lead change done in [place] rather than have either done here.  She is looking for someone to follow her.    Dr. Harris had and reviewed extensive records from the patient's [place] cardiologists were reviewed.  These noted a history slightly different that described above:     PAF, CHADS VASC 6, intolerant of warfarin, and unable to afford NOAC.   History of VT 2006 status post multiple ablations.   History of Lyme disease without proven cardiac involvement.   TIA October 2006.   SSS status post pacemaker placement December 2006.   Stress echocardiogram 2010 negative for ischemia EF 60%.   Paroxysmal atrial fibrillation noted on pacemaker checks March 2012.      CARDIOVASCULAR HISTORY:   PAF, intol of coumadin, s/p Watchman implant 3/5/18 at [facility]  SSS s/p [employer]acio PPM ([employer]urity, implanted 3/5/18 at [facility])  hx  CVA/TIA 10/2006  Hx VT s/p ablation 2006      PAST MEDICAL HISTORY:     Past Medical History:   Diagnosis Date    Asthma     Cataract     Diabetes mellitus     Hypertension        PAST SURGICAL HISTORY:     Past Surgical History:   Procedure Laterality Date    ATRIAL CARDIAC PACEMAKER INSERTION      CARDIAC PACEMAKER PLACEMENT      FOOT SURGERY         ALLERGIES AND MEDICATION:     Review of patient's allergies indicates:   Allergen Reactions    Shellfish containing products Anaphylaxis     Previous Medications    ACCU-CHEK MORENO PLUS TEST STRP STRP    TEST BLOOD GLUCOSE ONCE DAILY OR AS DIRECTED    ALBUTEROL (PROAIR HFA) 90 MCG/ACTUATION INHALER    Inhale 2 puffs into the lungs every 6 (six) hours as needed for Wheezing. Rescue    ASPIRIN 325 MG TABLET    Take 325 mg by mouth once daily.    ATENOLOL (TENORMIN) 25 MG TABLET    TK 1 T PO D    BECLOMETHASONE (QVAR) 40 MCG/ACTUATION AERO    Inhale 2 puffs into the lungs.    BLOOD SUGAR DIAGNOSTIC (BLOOD GLUCOSE TEST) STRP    Accu-Chek Compact Plus Test Strips   USE ONCE D TO TEST BLOOD SUGAR    ALON GLUCONATE/ALON LACTATE/GUM (GUAR GUM-CALCIUM ORAL)    Take by mouth once daily.    CARBOXYMETHYLCELLULOSE (REFRESH PLUS) 0.5 % DPET    1 drop 3 (three) times daily as needed.    CLOPIDOGREL (PLAVIX) 75 MG TABLET    Take 75 mg by mouth once daily.    EPINEPHRINE (EPIPEN JR) 0.15 MG/0.3 ML PEN INJECTION    Inject 0.15 mg into the muscle as needed for Anaphylaxis.    FENOFIBRATE MICRONIZED 30 MG CAP    Take 30 mg by mouth once daily.    FEXOFENADINE (ALLEGRA) 180 MG TABLET    Take 180 mg by mouth once daily.     GLIMEPIRIDE (AMARYL) 2 MG TABLET    TK 1 T PO D WITH XENA    LOSARTAN (COZAAR) 25 MG TABLET    TK 1 T PO BID    UNABLE TO FIND    Take 1 tablet by mouth nightly as needed. Leg Cramp    ZOLPIDEM (AMBIEN) 10 MG TAB           SOCIAL HISTORY:     Social History     Social History    Marital status:      Spouse name: N/A    Number of children: N/A    Years  of education: N/A     Occupational History    Not on file.     Social History Main Topics    Smoking status: Never Smoker    Smokeless tobacco: Never Used    Alcohol use No    Drug use: No    Sexual activity: No     Other Topics Concern    Not on file     Social History Narrative    No narrative on file       FAMILY HISTORY:     Family History   Problem Relation Age of Onset    Retinal detachment Mother     Blindness Brother     Amblyopia Brother     Macular degeneration Brother     Retinal detachment Brother     Glaucoma Father     Glaucoma Paternal Grandmother     Blindness Paternal Grandmother     Cataracts Neg Hx     Strabismus Neg Hx        REVIEW OF SYSTEMS:   Review of Systems   Constitutional: Negative for chills, diaphoresis and fever.   HENT: Negative for nosebleeds.    Eyes: Negative for blurred vision, double vision and photophobia.   Respiratory: Negative for hemoptysis, shortness of breath and wheezing.    Cardiovascular: Positive for palpitations. Negative for chest pain, orthopnea, claudication, leg swelling and PND.   Gastrointestinal: Negative for abdominal pain, blood in stool, heartburn, melena, nausea and vomiting.   Genitourinary: Negative for flank pain and hematuria.   Musculoskeletal: Negative for falls, myalgias and neck pain.   Skin: Negative for rash.   Neurological: Positive for dizziness. Negative for seizures, loss of consciousness, weakness and headaches.   Endo/Heme/Allergies: Negative for polydipsia. Does not bruise/bleed easily.   Psychiatric/Behavioral: Negative for depression and memory loss. The patient is not nervous/anxious.        PHYSICAL EXAM:     Past Medical History:   Diagnosis Date    Asthma     Cataract     Diabetes mellitus     Hypertension      Current Outpatient Prescriptions on File Prior to Visit   Medication Sig Dispense Refill    albuterol (PROAIR HFA) 90 mcg/actuation inhaler Inhale 2 puffs into the lungs every 6 (six) hours as needed for  "Wheezing. Rescue      aspirin 325 MG tablet Take 325 mg by mouth once daily.      atenolol (TENORMIN) 25 MG tablet TK 1 T PO D  3    beclomethasone (QVAR) 40 mcg/actuation Aero Inhale 2 puffs into the lungs.      carboxymethylcellulose (REFRESH PLUS) 0.5 % Dpet 1 drop 3 (three) times daily as needed.      clopidogrel (PLAVIX) 75 mg tablet Take 75 mg by mouth once daily.      fenofibrate micronized 30 mg Cap Take 30 mg by mouth once daily.      fexofenadine (ALLEGRA) 180 MG tablet Take 180 mg by mouth once daily.       glimepiride (AMARYL) 2 MG tablet TK 1 T PO D WITH XENA  3    losartan (COZAAR) 25 MG tablet TK 1 T PO BID  3    zolpidem (AMBIEN) 10 mg Tab       ACCU-CHEK MORENO PLUS TEST STRP Strp TEST BLOOD GLUCOSE ONCE DAILY OR AS DIRECTED  1     No current facility-administered medications on file prior to visit.      Vitals:    07/16/18 1042 07/16/18 1044   BP: 120/84 124/80   BP Location: Right arm Left arm   Patient Position: Sitting Sitting   BP Method: Large (Automatic) Large (Automatic)   Pulse: 60 60   SpO2: 98%    Weight: 105.6 kg (232 lb 12.9 oz)    Height: 5' 4" (1.626 m)      Body mass index is 39.96 kg/m².        Physical Exam   Constitutional: She is oriented to person, place, and time. She appears well-developed and well-nourished. She is cooperative.  Non-toxic appearance. No distress.   HENT:   Head: Normocephalic and atraumatic.   Eyes: Conjunctivae and EOM are normal. Pupils are equal, round, and reactive to light. No scleral icterus.   Neck: Trachea normal. Neck supple. Normal carotid pulses and no JVD present. Carotid bruit is not present. No neck rigidity. No edema present. No thyromegaly present.   Cardiovascular: Normal rate, regular rhythm, S1 normal and S2 normal.  PMI is not displaced.  Exam reveals distant heart sounds. Exam reveals no gallop and no friction rub.    No murmur heard.  Pulses:       Carotid pulses are 2+ on the right side, and 2+ on the left side.  L sided PPM " scar well healed.   Pulmonary/Chest: Effort normal and breath sounds normal. No respiratory distress. She has no wheezes. She has no rales. She exhibits no tenderness.   Abdominal: Soft. Bowel sounds are normal. She exhibits no distension. There is no hepatosplenomegaly.   obese   Musculoskeletal: She exhibits no edema or tenderness.   Feet:   Right Foot:   Skin Integrity: Negative for ulcer.   Left Foot:   Skin Integrity: Negative for ulcer.   Neurological: She is alert and oriented to person, place, and time. No cranial nerve deficit.   Skin: Skin is warm and dry. No rash noted. No erythema.   Psychiatric: She has a normal mood and affect. Her speech is normal and behavior is normal.   Vitals reviewed.    Labs dated March 1, 2017  Total cholesterol 171  Eduin was 94  HDL 64  LDL 88  ALT 13    Records from the patient's [place] cardiologists ([facility]).  These note:  PAF, CHADS VASC 6, intolerant of warfarin, and unable to afford NOAC.  History of VT 2006 status post multiple ablations.  History of Lyme disease without proven cardiac involvement.  TIA October 2006.  SSS status post pacemaker placement December 2006.  Stress echocardiogram 2010 negative for ischemia EF 60%.  Paroxysmal atrial fibrillation noted on pacemaker checks March 2012.    Cardiovascular Testing:  As above  ARIELA from [facility] 5/2018 to be brought by pt to next OV    ASSESSMENT:   # ?hx CAD/MI  # PAF intol of warfarin s/p Watchman implant 3/5/18 at [facility] ([place]), continuing coumadin for now  # SSS s/p PPM ([employer]) gen changed 3/5/18  # Hx VT s/p ablation 2006  # HTN, mildly uncontrolled  # DM  # ?elev TG, prev intol of statins  # BMI 40, up 1 unit vs last OV  # hx CVA    PLAN:     Continue current meds with DAP Rx.  Patient to obtain medical records from [place] for Dr. Rai.  F/u with Dr. Rai for PPM, watchman, afib, and WPW/VT ablations.  F/u with me PRN.

## 2018-08-06 ENCOUNTER — CLINICAL SUPPORT (OUTPATIENT)
Dept: ELECTROPHYSIOLOGY | Facility: CLINIC | Age: 63
End: 2018-08-06
Attending: INTERNAL MEDICINE
Payer: MEDICARE

## 2018-08-06 ENCOUNTER — INITIAL CONSULT (OUTPATIENT)
Dept: ELECTROPHYSIOLOGY | Facility: CLINIC | Age: 63
End: 2018-08-06
Payer: MEDICARE

## 2018-08-06 ENCOUNTER — HOSPITAL ENCOUNTER (OUTPATIENT)
Dept: CARDIOLOGY | Facility: CLINIC | Age: 63
Discharge: HOME | End: 2018-08-06
Payer: MEDICARE

## 2018-08-06 VITALS
SYSTOLIC BLOOD PRESSURE: 102 MMHG | WEIGHT: 232 LBS | DIASTOLIC BLOOD PRESSURE: 78 MMHG | HEIGHT: 64 IN | BODY MASS INDEX: 39.61 KG/M2 | HEART RATE: 60 BPM

## 2018-08-06 DIAGNOSIS — I48.91 ATRIAL FIBRILLATION, UNSPECIFIED TYPE: ICD-10-CM

## 2018-08-06 DIAGNOSIS — I10 ESSENTIAL HYPERTENSION: ICD-10-CM

## 2018-08-06 DIAGNOSIS — Z95.0 CARDIAC PACEMAKER IN SITU: ICD-10-CM

## 2018-08-06 DIAGNOSIS — Z95.0 CARDIAC PACEMAKER IN SITU: Primary | ICD-10-CM

## 2018-08-06 DIAGNOSIS — I48.0 PAROXYSMAL ATRIAL FIBRILLATION: ICD-10-CM

## 2018-08-06 DIAGNOSIS — E11.9 DM TYPE 2 WITHOUT RETINOPATHY: ICD-10-CM

## 2018-08-06 PROCEDURE — 99999 PR PBB SHADOW E&M-EST. PATIENT-LVL III: CPT | Mod: PBBFAC,,, | Performed by: INTERNAL MEDICINE

## 2018-08-06 PROCEDURE — 93005 ELECTROCARDIOGRAM TRACING: CPT | Mod: PBBFAC | Performed by: INTERNAL MEDICINE

## 2018-08-06 PROCEDURE — 93280 PM DEVICE PROGR EVAL DUAL: CPT | Mod: PBBFAC | Performed by: INTERNAL MEDICINE

## 2018-08-06 PROCEDURE — 99205 OFFICE O/P NEW HI 60 MIN: CPT | Mod: S$PBB,,, | Performed by: INTERNAL MEDICINE

## 2018-08-06 PROCEDURE — 99211 OFF/OP EST MAY X REQ PHY/QHP: CPT | Mod: PBBFAC,27

## 2018-08-06 PROCEDURE — 99999 PR PBB SHADOW E&M-EST. PATIENT-LVL I: CPT | Mod: PBBFAC,,,

## 2018-08-06 PROCEDURE — 99213 OFFICE O/P EST LOW 20 MIN: CPT | Mod: PBBFAC,25 | Performed by: INTERNAL MEDICINE

## 2018-08-06 PROCEDURE — 93010 ELECTROCARDIOGRAM REPORT: CPT | Mod: S$PBB,,, | Performed by: INTERNAL MEDICINE

## 2018-08-06 NOTE — LETTER
August 6, 2018      Luis Alberto Olea MD  [address]  [facility] [address]           [facility] - Arrhythmia  [address]  [facility] [address]  Phone: [phone]  Fax: [phone]          Patient: Ailin Brown   MR Number: [id number]   YOB: 1955   Date of Visit: 8/6/2018       Dear Dr. Luis Alberto Olea:    Thank you for referring Ailin Brown to me for evaluation. Attached you will find relevant portions of my assessment and plan of care.    If you have questions, please do not hesitate to call me. I look forward to following Ailin Brown along with you.    Sincerely,    Jun Rai MD    Enclosure  CC:  No Recipients    If you would like to receive this communication electronically, please contact [email or web address] or [phone] to request more information on [facility] access.    For providers and/or their staff who would like to refer a patient to [facility], please contact us through our one-stop-shop provider referral line, [facility], at [phone].    If you feel you have received this communication in error or would no longer like to receive these types of communications, please e-mail [email or web address]

## 2018-08-06 NOTE — PROGRESS NOTES
Subjective:    Patient ID:  Ailin Brown is a 62 y.o. female who presents for evaluation of Atrial Fibrillation      62 yoF SSS s/p DC PM 2007, s/p watchman device 3/18 here to establish care. She had her PM and watchman device implanted at [facility] in [place]. ARIELA revealed sealed LORIE, warfarin was stopped. On aspirin and plavix until 6 month clinton, 9/8/18. She has recently moved to [place]. WPW, AVNRT, AF, AFL ablations have been performed (per patient). She also had paroxysmal VT requiring ablation as well. She had a DC PM implanted 3/18 for SSS- this was a device revision for a prior device with a faulty atrial lead. She has had no AMS on her interrogation today. Normal DC PM function with ~90% AP, 0% .     Past Medical History:  No date: Asthma  No date: Cataract  No date: Diabetes mellitus  No date: Hypertension    Past Surgical History:  No date: ATRIAL CARDIAC PACEMAKER INSERTION  No date: CARDIAC PACEMAKER PLACEMENT  No date: FOOT SURGERY    Social History    Marital status:             Spouse name:                       Years of education:                 Number of children:               Occupational History    None on file    Social History Main Topics    Smoking status: Never Smoker                                                                Smokeless tobacco: Never Used                        Alcohol use: No              Drug use: No              Sexual activity: No                   Other Topics            Concern    None on file    Social History Narrative    None on file        Review of patient's family history indicates:  Problem: Retinal detachment      Relation: Mother       Age of Onset: (Not Specified)   Problem: Blindness      Relation: Brother       Age of Onset: (Not Specified)   Problem: Amblyopia      Relation: Brother       Age of Onset: (Not Specified)   Problem: Macular degeneration      Relation: Brother       Age of Onset: (Not Specified)   Problem:  Retinal detachment      Relation: Brother       Age of Onset: (Not Specified)   Problem: Glaucoma      Relation: Father       Age of Onset: (Not Specified)   Problem: Glaucoma      Relation: Paternal Grandmother       Age of Onset: (Not Specified)   Problem: Blindness      Relation: Paternal Grandmother       Age of Onset: (Not Specified)   Problem: Cataracts      Relation: Neg Hx       Age of Onset: (Not Specified)   Problem: Strabismus      Relation: Neg Hx       Age of Onset: (Not Specified)           Review of Systems   Constitution: Negative.   HENT: Negative.    Eyes: Negative.    Cardiovascular: Negative for chest pain, dyspnea on exertion, leg swelling, near-syncope, palpitations and syncope.   Respiratory: Negative.  Negative for shortness of breath.    Endocrine: Negative.    Hematologic/Lymphatic: Negative.    Skin: Negative.    Musculoskeletal: Negative.    Gastrointestinal: Negative.    Genitourinary: Negative.    Neurological: Negative.  Negative for dizziness and light-headedness.   Psychiatric/Behavioral: Negative.    Allergic/Immunologic: Negative.         Objective:    Physical Exam   Constitutional: She is oriented to person, place, and time. She appears well-developed and well-nourished. No distress.   HENT:   Head: Normocephalic and atraumatic.   Eyes: EOM are normal. Pupils are equal, round, and reactive to light.   Neck: Normal range of motion. No JVD present. No thyromegaly present.   Cardiovascular: Normal rate, regular rhythm, S1 normal, S2 normal and normal heart sounds.  PMI is not displaced.  Exam reveals no gallop and no friction rub.    No murmur heard.  Pulmonary/Chest: Effort normal and breath sounds normal. No respiratory distress. She has no wheezes. She has no rales.   Abdominal: Soft. Bowel sounds are normal. She exhibits no distension. There is no tenderness. There is no rebound and no guarding.   Musculoskeletal: Normal range of motion. She exhibits no edema or tenderness.    Neurological: She is alert and oriented to person, place, and time. No cranial nerve deficit.   Skin: Skin is warm and dry. No rash noted. No erythema.   Psychiatric: She has a normal mood and affect. Her behavior is normal. Judgment and thought content normal.   Vitals reviewed.      ECG: NSR nl AK, QRS, QTc      Assessment:       1. Cardiac pacemaker in situ    2. Essential hypertension    3. DM type 2 without retinopathy    4. Paroxysmal atrial fibrillation         Plan:       62 yoF pAF, AFL, WPW, VT s/p LORIE occlusion, s/p DC PM here to establish care. She has normal DC PM function with 90% AP and no AMS. She is on month 5 of 6 of DAPT, she will stop plavix 9/8/18. I discussed routine device follow up including quarterly to bi-annual device checks for device function as well as yearly follow up in the EP clinic. The patient  was advised to call with any concerns regarding their device. Device clinic follow up as scheduled. RTC 1y

## 2019-01-30 ENCOUNTER — CLINICAL SUPPORT (OUTPATIENT)
Dept: INTERNAL MEDICINE | Facility: CLINIC | Age: 64
End: 2019-01-30
Payer: MEDICARE

## 2019-01-30 ENCOUNTER — OFFICE VISIT (OUTPATIENT)
Dept: INTERNAL MEDICINE | Facility: CLINIC | Age: 64
End: 2019-01-30
Payer: MEDICARE

## 2019-01-30 VITALS
WEIGHT: 237 LBS | BODY MASS INDEX: 40.46 KG/M2 | SYSTOLIC BLOOD PRESSURE: 120 MMHG | OXYGEN SATURATION: 97 % | HEART RATE: 94 BPM | DIASTOLIC BLOOD PRESSURE: 65 MMHG | HEIGHT: 64 IN

## 2019-01-30 DIAGNOSIS — E11.9 TYPE 2 DIABETES MELLITUS WITHOUT COMPLICATION, WITHOUT LONG-TERM CURRENT USE OF INSULIN: ICD-10-CM

## 2019-01-30 DIAGNOSIS — R20.2 PARESTHESIA AND PAIN OF BOTH UPPER EXTREMITIES: ICD-10-CM

## 2019-01-30 DIAGNOSIS — J45.20 MILD INTERMITTENT ASTHMA WITHOUT COMPLICATION: ICD-10-CM

## 2019-01-30 DIAGNOSIS — M79.602 PARESTHESIA AND PAIN OF BOTH UPPER EXTREMITIES: ICD-10-CM

## 2019-01-30 DIAGNOSIS — N95.1 POST MENOPAUSAL SYNDROME: ICD-10-CM

## 2019-01-30 DIAGNOSIS — I10 ESSENTIAL HYPERTENSION: ICD-10-CM

## 2019-01-30 DIAGNOSIS — Z00.00 HEALTH MAINTENANCE EXAMINATION: ICD-10-CM

## 2019-01-30 DIAGNOSIS — Z86.73 HX OF TRANSIENT ISCHEMIC ATTACK (TIA): ICD-10-CM

## 2019-01-30 DIAGNOSIS — M70.62 TROCHANTERIC BURSITIS OF LEFT HIP: Primary | ICD-10-CM

## 2019-01-30 DIAGNOSIS — M79.601 PARESTHESIA AND PAIN OF BOTH UPPER EXTREMITIES: ICD-10-CM

## 2019-01-30 DIAGNOSIS — R26.9 ABNORMALITY OF GAIT AND MOBILITY: ICD-10-CM

## 2019-01-30 DIAGNOSIS — Z98.890 S/P FOOT SURGERY, RIGHT: ICD-10-CM

## 2019-01-30 PROCEDURE — G0009 ADMIN PNEUMOCOCCAL VACCINE: HCPCS | Mod: PBBFAC

## 2019-01-30 PROCEDURE — 99203 PR OFFICE/OUTPT VISIT, NEW, LEVL III, 30-44 MIN: ICD-10-PCS | Mod: S$PBB,GC,, | Performed by: STUDENT IN AN ORGANIZED HEALTH CARE EDUCATION/TRAINING PROGRAM

## 2019-01-30 PROCEDURE — 99215 OFFICE O/P EST HI 40 MIN: CPT | Mod: PBBFAC,27 | Performed by: STUDENT IN AN ORGANIZED HEALTH CARE EDUCATION/TRAINING PROGRAM

## 2019-01-30 PROCEDURE — 99203 OFFICE O/P NEW LOW 30 MIN: CPT | Mod: S$PBB,GC,, | Performed by: STUDENT IN AN ORGANIZED HEALTH CARE EDUCATION/TRAINING PROGRAM

## 2019-01-30 PROCEDURE — 99211 OFF/OP EST MAY X REQ PHY/QHP: CPT | Mod: PBBFAC,25

## 2019-01-30 PROCEDURE — 99999 PR PBB SHADOW E&M-EST. PATIENT-LVL I: ICD-10-PCS | Mod: PBBFAC,,,

## 2019-01-30 PROCEDURE — 99999 PR PBB SHADOW E&M-EST. PATIENT-LVL V: ICD-10-PCS | Mod: PBBFAC,GC,, | Performed by: STUDENT IN AN ORGANIZED HEALTH CARE EDUCATION/TRAINING PROGRAM

## 2019-01-30 PROCEDURE — 81001 URINALYSIS AUTO W/SCOPE: CPT

## 2019-01-30 PROCEDURE — 99999 PR PBB SHADOW E&M-EST. PATIENT-LVL I: CPT | Mod: PBBFAC,,,

## 2019-01-30 PROCEDURE — 99999 PR PBB SHADOW E&M-EST. PATIENT-LVL V: CPT | Mod: PBBFAC,GC,, | Performed by: STUDENT IN AN ORGANIZED HEALTH CARE EDUCATION/TRAINING PROGRAM

## 2019-01-30 RX ORDER — ALBUTEROL SULFATE 90 UG/1
2 AEROSOL, METERED RESPIRATORY (INHALATION) EVERY 6 HOURS PRN
Status: SHIPPED | OUTPATIENT
Start: 2019-01-30

## 2019-01-30 NOTE — PATIENT INSTRUCTIONS
"  Preventing Falls: Are You At Risk of Falling?     Ask for help to reduce risk of falling in your home.     As you get older, you're not as steady on your feet as you once were. And you may have health problems you didn't have when you were younger. So, it's not surprising that older people are more likely to trip and fall. Falling can be very serious. It can change your overall health and quality of life. That's why it's important to be aware of your own risk of falling.  The dangers of falling  Falls are one of the main causes of injury in people over age 65. An older person who falls may take longer to get better than a younger person. And, after a fall, an older person is more likely to have problems that don't go away. So, preventing falls can help you avoid serious health problems.  Are you at risk of falling?  Answer these questions to rate your level of risk.  · Are you a woman?  · Have you fallen or stumbled in the last year?  · Are you over age 65?  · Are you ever dizzy or lightheaded with standing?  · Do you have a hard time getting in and out of the bathtub or on and off the toilet?  · Do you lean on objects to help you get around? Or do you use a cane or walker?  · Do you have vision or hearing problems? For example, do you need new glasses or hearing aids?  · Do you have 2 or more long-lasting (chronic) medical conditions?  · Do you take 3 or more medicines?  · Have you felt depressed recently?  · Have you had more trouble with your memory in recent months?  · Are there hazards in your home that might cause you to fall, such as loose rugs or poor lighting?  · Do you have a pet that jumps on you or might trip you?  · Have you stopped getting regular exercise?  · Do you have diabetes?   · Do you have a neurologic disease, such as Parkinson or Alzheimer disease?   · Do you drink alcohol?  · Do you wear athletic shoes or slippers, or go barefoot at home?  You can help prevent falls  If you answered "yes" " to any of the above questions, you should take steps to reduce your risk of a fall. Monitoring health conditions and keeping walkways in your home free of clutter are just 2 ways. Changing is sometimes easier said than done. But keep in mind that even small changes can make you less likely to fall.  The fear of falling  It's normal to be scared of falling, especially if you've fallen before. But being afraid can actually make you more likely to fall. This is because:  · Fear might cause you to become less active. Being less active can lead to a loss of strength and balance.  · Fear can lead to isolation from others, depression, or the use of more medicines or alcohol. And all these things make falling even more likely.  To break the cycle, learn more about ways to avoid falling. As you take control, you may find yourself feeling less afraid.   Date Last Reviewed: 6/12/2015  © 3905-9052 [employer]. [address] [place] [address], [place] [address]. All rights reserved. This information is not intended as a substitute for professional medical care. Always follow your healthcare professional's instructions.      Women and Heart Disease: Tips for Making Small Changes  Making even one lifestyle change for your heart reduces your risk for heart disease. Change is hard for everyone, so take it one step at a time. Here are some tips to help you get started on making changes that are good for your heart.    Make a plan  Trying to do too much too fast can end in failure:  · Start by writing down all the things youd like to do to lower your risks.  · Break each one into small steps. If you said, Cut down on fat, a small step could be to use fruit spread instead of butter on your toast. Or to take soup and a roll for lunch instead of going out for a hamburger and fries.  · Decide which step youd like to take first. Then choose a second and a third step.  · Check off each step as you achieve it. Add new steps as you  go along.  · Set a deadline to meet each of your steps and help you stick with the new rule you have made for yourself.  · If a step isnt working, try another. Come back to the first one later.  Keep records  Keeping records helps you know your habits and see your successes:  · Keeping an exercise record can help you see your progress and keep you going. Try logging your activities every week. This will give you a chance to look back at the end of the week and change as you need to.  · Keeping food records can help you see your eating patterns and plan ways to make small changes. Try writing down the foods you eat each day. You will find it easier to be more consistent in making healthy choices.  · Noting when you feel stressed or want to smoke can help you think of ways to avoid these triggers. Write down a list of activities you would rather do to not give into these impulses.  Reward yourself  Making changes isnt easy. You deserve to reward yourself when you succeed. Just making the change may be its own reward. But why not give yourself an extra pat on the back?  · Give yourself something special youve been wanting.  · Do something that youve always promised yourself youd do, such as going dancing.  · Treat yourself to an activity you'll enjoy after a successful week.  Date Last Reviewed: 2/1/2017  © 4641-1293 [employer]. [address] [place] [address], [place] [id number]. All rights reserved. This information is not intended as a substitute for professional medical care. Always follow your healthcare professional's instructions.      Low-Cholesterol Diet  Your body needs cholesterol to build new cells and create certain hormones. There are 2 kinds of cholesterol in your blood:     · HDL (good) cholesterol. This prevents fat deposits (plaque) from building up in your arteries. In this way it protects against heart disease and stroke.  · LDL (bad) cholesterol. This stays in your body and sticks to  artery walls. Over time it may block blood flow to the heart and brain. This can cause a heart attack or stroke.  The cholesterol in your blood comes from 2 sources: cholesterol in food that you eat and cholesterol that your liver makes. You should limit the amount of cholesterol in your diet. But the cholesterol that your body makes has the greatest disease risk. And your body makes more cholesterol when your diet is high in bad fats (saturated and trans fats). There are 2 kinds of fats you can eat:  · Good fats, or unsaturated fats (mono-unsaturated and poly-unsaturated). They raise the level of good cholesterol and lower the level of bad cholesterol. Good fats are found in vegetable oils such as olive, sunflower, corn, and soybean oils, and in nuts and seeds.  · Bad fats, or saturated fats (including foods high in cholesterol) and trans fats. These raise your risk of disease. They lower the good cholesterol and raise the level of bad cholesterol. Bad fats are found in animal products, including meat, whole-milk dairy products, and butter. Some plants are also high in bad fats (coconut and palm plants). Trans fats are found in hard (stick) margarines. They are also in many fast foods and commercially baked goods. Soft margarine sold in tubs has fewer trans fats and is safer to use.  High blood cholesterol is usually due to a diet high in saturated fat, along with not being physically active. In some cases, genetics plays a role in causing high cholesterol. The tips below will help you create healthy eating habits that will help lower your blood cholesterol level.  Create a diet high in good fats, low in bad fats (and low in cholesterol)  The following steps will help you create a diet high in good fats and low in bad fats:  · Talk with your doctor before starting a low cholesterol diet or weight loss program.  · Learn to read nutrition labels and select appropriate portion sizes.  · When cooking, use plant-based  unsaturated vegetable oils (sunflower, corn, soybean, canola, peanut, and olive oils).  · Avoid saturated fats found in animal products such as meat, dairy (whole-milk, cheese and ice cream), poultry skin, and egg yolks. Plants high in saturated oils include coconut oil, palm oil, and palm kernel oil.  · If you eat meat, choose smaller portions and lean cuts, such as round, dai, sirloin, or loin. Eat more meatless meals.  · Replace meat with fish at least 2 times a week. Fish is an important source of the unsaturated fat called omega-3 fatty acids. This fat has potential to lower the risk of heart disease.  · Replace whole-milk dairy products with low-fat or nonfat products. Try soy products. Soy helps to reduce total cholesterol.  · Supplement your diet with protective fibers. Eat nuts, seeds, and whole grains rather than white rice and bread. These foods lower both cholesterol and triglyceride levels. (Triglycerides are another fat found in the blood.) Walnuts are one of the best sources of omega-3 fatty acids.  · Eat plenty of fresh fruits and vegetables daily.  · Avoid fast foods and commercial baked goods. Assume they contain saturated fat unless labeled otherwise.  Date Last Reviewed: 8/1/2016  © 5091-6414 [employer]. [address] [place] [address], [place] [address]. All rights reserved. This information is not intended as a substitute for professional medical care. Always follow your healthcare professional's instructions.

## 2019-01-31 ENCOUNTER — HOSPITAL ENCOUNTER (OUTPATIENT)
Dept: RADIOLOGY | Facility: HOSPITAL | Age: 64
Discharge: HOME | End: 2019-01-31
Attending: STUDENT IN AN ORGANIZED HEALTH CARE EDUCATION/TRAINING PROGRAM
Payer: MEDICARE

## 2019-01-31 DIAGNOSIS — Z98.890 S/P FOOT SURGERY, RIGHT: ICD-10-CM

## 2019-01-31 LAB
BACTERIA #/AREA URNS AUTO: ABNORMAL /HPF
BILIRUB UR QL STRIP: NEGATIVE
CLARITY UR REFRACT.AUTO: CLEAR
COLOR UR AUTO: YELLOW
GLUCOSE UR QL STRIP: ABNORMAL
HGB UR QL STRIP: NEGATIVE
HYALINE CASTS UR QL AUTO: 3 /LPF
KETONES UR QL STRIP: NEGATIVE
LEUKOCYTE ESTERASE UR QL STRIP: NEGATIVE
MICROSCOPIC COMMENT: ABNORMAL
NITRITE UR QL STRIP: NEGATIVE
PH UR STRIP: 5 [PH] (ref 5–8)
PROT UR QL STRIP: NEGATIVE
RBC #/AREA URNS AUTO: 0 /HPF (ref 0–4)
SP GR UR STRIP: 1.01 (ref 1–1.03)
SQUAMOUS #/AREA URNS AUTO: 0 /HPF
URN SPEC COLLECT METH UR: ABNORMAL
WBC #/AREA URNS AUTO: 0 /HPF (ref 0–5)

## 2019-01-31 PROCEDURE — 73630 X-RAY EXAM OF FOOT: CPT | Mod: 26,RT,, | Performed by: RADIOLOGY

## 2019-01-31 PROCEDURE — 73630 X-RAY EXAM OF FOOT: CPT | Mod: TC,RT

## 2019-01-31 PROCEDURE — 73630 XR FOOT COMPLETE 3 VIEW RIGHT: ICD-10-PCS | Mod: 26,RT,, | Performed by: RADIOLOGY

## 2019-01-31 NOTE — PROGRESS NOTES
Subjective:       Patient ID: Ailin Brown is a 63 y.o. female.    Chief Complaint: Establish Care    HPI     Ms. Brown is a 62 years old female with sick sinus syndrome s/p pacemaker placement in 2007, s/p watchman device 3/18, CVA 2006 with no residual weakness presents to establish care.    Patients states she is in usual state of health. However, she would like following complaints to be addressed today, as well as health maintenance.     1) Right Hip Pain: Patient complaints of right hip pain for 2-3 years. States the pain is associated with movement and external rotation at the right hip. Moderate pain 5-6/10, aching, with no radiation. No associated numbness or tingling, leg weakness or swelling, wound, fever, chills, joint erythema or swelling. Pain is worse upon lying on at the right side of the hip during night, therefore, patient prefers sleeping either on her back or turning on the left side. She takes tylenol and/or NSAID and pain is controlled with pain medications.     2) Right Foot Pain: Patient has a history of abnormal arch of the foot s/p planter surgery with fixation. More recently, has increased pain of the right foot and endorses has increased pain in the toes. Patient also is having difficulty with foot wear.     3) Health Maintenance: 63 years old female recently moved from [place] and would like to establish care. Age appropriate screenings discussed.   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  Did not receive any vaccines recently in past 1-2 years.       Past Medical History:   Diagnosis Date    Asthma     CVA 2006     Diabetes mellitus     Hypertension     PAF (paroxysmal atrial  "fibrillation)     Sick sinus syndrome      Past Surgical History:   Procedure Laterality Date    ATRIAL CARDIAC PACEMAKER INSERTION      CARDIAC PACEMAKER PLACEMENT      FOOT SURGERY       Family History   Problem Relation Age of Onset    Retinal detachment Mother     Blindness Brother     Amblyopia Brother     Macular degeneration Brother     Retinal detachment Brother     Glaucoma Father     Glaucoma Paternal Grandmother     Blindness Paternal Grandmother     Cataracts Neg Hx     Strabismus Neg Hx      Social History     Socioeconomic History    Marital status:    Occupational History    None   Tobacco Use    Smoking status: Never Smoker    Smokeless tobacco: Never Used   Substance and Sexual Activity    Alcohol use: No    Drug use: No    Sexual activity: No     Review of Systems   Constitutional: Positive for activity change and fatigue. Negative for chills and fever.   HENT: Negative for postnasal drip, rhinorrhea and sinus pain.    Respiratory: Negative for cough, choking, chest tightness, shortness of breath and wheezing.    Cardiovascular: Negative for chest pain, palpitations and leg swelling.   Gastrointestinal: Negative for blood in stool, constipation, diarrhea and nausea.   Genitourinary: Negative for dysuria, flank pain and frequency.   Musculoskeletal: Positive for arthralgias and gait problem.   Neurological: Negative for tremors, seizures, syncope, speech difficulty and weakness.       Objective:     /65 (BP Location: Left arm, Patient Position: Sitting, BP Method: Large (Manual))   Pulse 94   Ht 5' 4" (1.626 m)   Wt 107.5 kg (236 lb 15.9 oz)   SpO2 97%   BMI 40.68 kg/m²     Physical Exam    Constitutional: She is oriented to person, place, and time. She appears well-developed and well-nourished. No distress.   HENT:   Head: Normocephalic and atraumatic.   Eyes: EOM are normal. Pupils are equal, round, and reactive to light.   Neck: Normal range of motion. No " JVD present. No thyromegaly present.   Cardiovascular: Normal rate, regular rhythm, S1 normal, S2 normal and normal heart sounds.  PMI is not displaced.  Exam reveals no gallop and no friction rub.    No murmur heard.  Pulmonary/Chest: Effort normal and breath sounds normal. No respiratory distress. She has no wheezes. She has no rales.   Abdominal: Soft. Bowel sounds are normal. She exhibits no distension. There is no tenderness. There is no rebound and no guarding.   Musculoskeletal: Pain on palpation Right trochanter. On inspection no obvious deformity, swelling, effusion, bruising. No pain on passive range of motion. Pain with external rotation and active ROM right leg. ROM both active and passive intact on left hip, left knee joint, and right knee joint.  LEFT Foot: On inspection, High arch planter surface of the left foot. On exam, pain with active ROM of left hallux. No pain illicited with active or passive ROM right foot.   Neurological: She is alert and oriented to person, place, and time. No cranial nerve deficit. Motor strength 5/5 UE and LE. No sensory deficits. DTR 2+. No dysmetria, normal finger to nose testing  Skin: Skin is warm and dry. No rash noted. No erythema.   Psychiatric: She has a normal mood and affect. Her behavior is normal. Judgment and thought content normal.     Assessment:       1. Trochanteric bursitis of left hip    2. Unstable gait    3. Essential hypertension    4. Mild intermittent asthma without complication    5. Health maintenance examination    6. Cerebral infarction  History jn 2006   7. Diabetes mellitus due to underlying condition with diabetic neuropathy     8. Post menopausal syndrome        Plan:     1) Trochanteric bursitis of left hip  Patient complaints of right hip pain for 2-3 years. States the pain is associated with movement and external rotation at the right hip. Moderate pain 5-6/10, aching, with no radiation. No associated numbness or tingling, leg weakness or  swelling, wound, fever, chills, joint erythema or swelling. Pain is worse upon lying on at the right side of the hip during night, therefore, patient prefers sleeping either on her back or turning on the left side. She takes tylenol and/or NSAID and pain is controlled with pain medications.    Plan:  - Continue taking tylenol and NSAID's for pain relief   - avoid lying on left hip     2) Right foot pain and gait instability   Patient has a history of abnormal arch of the foot s/p planter surgery with fixation. More recently, has increased pain of the right foot and endorses has increased pain in the toes. Patient also is having difficulty with foot wear.     Plan:  -    Rolling walker to commute  -    Referral to orthopedics   -     Ambulatory Referral to Orthopedics  -     X-Ray Foot Complete 3 view Right; Future; Expected date: 01/30/2019    3) Essential hypertension  Patient has a history of hypertension. BP noted to be 120/65 mmHg on arrival. Patient BP has been ranging in 120's and 60's at home. Follows sodium restricted diet. Follows cardiology.  Plan:         - On lasartan 25 mg BID       - wean off atenolol 40 more days per cardiology reccs    4) Mild intermittent asthma without complication  - well controlled.   - albuterol inhaler 2 puffs Q6H PRN    5) Health maintenance examination  Age appropriate screenings were discussed:   - She has been diagnosed with diabetes; No complaints of polyuria, polydipsia, polyphagia. Will do one time screen of  Hemoglobin A1c.  - Patient with h/o HTN, diabetes mellitus type II. Will get lipid panel. Following were advised: 1) moderate physical activity 40 minutes per session for 3-4 sessions per week. 2) encourage vegetable, fruits, low dairy foods. Limit sweetened beverages and red meat.   - Post menopausal age 63 year, not on osteoporosis treatment, never a smoker, no alcohol consumption, no prior fractures: One time DEXA screen  - Colonoscopy completed 2.5 years ago,  polyps removed and would get us the records for colonoscopy  - Mammogram followed by ultrasound in Oct 2018; found to have ductal aplasia, will get records.  Mammo Digital Diagnostic Bilateral With CAD; Future; Expected date: 10/25/2018  - Her PAP smear testing was conducted over 5 years ago. Will consider a gynecology referral for Well Women Exam and Pap Smear with HPV testing at next visit   - will do the following routine labs:  -     TSH; Future; Expected date: 10/25/2018  -     Vitamin D; Future; Expected date: 10/25/2018  -     Comprehensive metabolic panel; Future; Expected date: 01/30/2019  -     Hepatitis C antibody; Future; Expected date: 01/30/2019  -     Pneumovax administered; flu vaccine received in 10/2018    6) Cerebral infarction history in 2006  -     Lipid panel; Future; Expected date: 01/30/2019    7) Diabetes mellitus with paresthesia and non-focal neurologic exam        -     Complaints of intermittent numbness and tingling of arms, no weakness. No cervical and back pain. Nutritional deficiencies (B12, anemia) cannot be excluded.    Plan:  -     Hemoglobin A1c; Future; Expected date: 01/30/2019  -     Vitamin B12; Future; Expected date: 01/30/2019  -     Methylmalonic acid, serum; Future; Expected date: 01/30/2019  -     CBC, TSH  -     Folate; Future; Expected date: 01/30/2019  -     Urinalysis Complete  -     Ambulatory Referral to Ophthalmology    8) Post menopausal syndrome  -     DXA Bone Density Spine And Hip; Future; Expected date: 01/30/2019    Patient given a follow on 04/23/2019.     Jonathan Sanderson MD  PGY II Internal Medicine  [facility]  [facility] [address]

## 2019-02-04 ENCOUNTER — TELEPHONE (OUTPATIENT)
Dept: INTERNAL MEDICINE | Facility: CLINIC | Age: 64
End: 2019-02-04

## 2019-02-04 ENCOUNTER — OFFICE VISIT (OUTPATIENT)
Dept: ORTHOPEDICS | Facility: CLINIC | Age: 64
End: 2019-02-04
Payer: MEDICARE

## 2019-02-04 ENCOUNTER — HOSPITAL ENCOUNTER (OUTPATIENT)
Dept: RADIOLOGY | Facility: HOSPITAL | Age: 64
Discharge: HOME | End: 2019-02-04
Attending: PHYSICIAN ASSISTANT
Payer: MEDICARE

## 2019-02-04 VITALS
SYSTOLIC BLOOD PRESSURE: 123 MMHG | HEIGHT: 64 IN | HEART RATE: 72 BPM | DIASTOLIC BLOOD PRESSURE: 86 MMHG | BODY MASS INDEX: 39.97 KG/M2 | WEIGHT: 234.13 LBS

## 2019-02-04 DIAGNOSIS — M79.671 RIGHT FOOT PAIN: ICD-10-CM

## 2019-02-04 DIAGNOSIS — M19.079 1ST MTP ARTHRITIS: ICD-10-CM

## 2019-02-04 DIAGNOSIS — M72.2 PLANTAR FASCIITIS, RIGHT: ICD-10-CM

## 2019-02-04 DIAGNOSIS — M25.561 RIGHT KNEE PAIN, UNSPECIFIED CHRONICITY: ICD-10-CM

## 2019-02-04 DIAGNOSIS — M17.11 PRIMARY OSTEOARTHRITIS OF RIGHT KNEE: Primary | ICD-10-CM

## 2019-02-04 PROCEDURE — 73564 XR KNEE ORTHO RIGHT WITH FLEXION: ICD-10-PCS | Mod: 26,RT,, | Performed by: RADIOLOGY

## 2019-02-04 PROCEDURE — 73564 X-RAY EXAM KNEE 4 OR MORE: CPT | Mod: 26,RT,, | Performed by: RADIOLOGY

## 2019-02-04 PROCEDURE — 73562 X-RAY EXAM OF KNEE 3: CPT | Mod: TC,RT

## 2019-02-04 PROCEDURE — 99215 OFFICE O/P EST HI 40 MIN: CPT | Mod: PBBFAC,25 | Performed by: PHYSICIAN ASSISTANT

## 2019-02-04 PROCEDURE — 99999 PR PBB SHADOW E&M-EST. PATIENT-LVL V: ICD-10-PCS | Mod: PBBFAC,,, | Performed by: PHYSICIAN ASSISTANT

## 2019-02-04 PROCEDURE — 99213 PR OFFICE/OUTPT VISIT, EST, LEVL III, 20-29 MIN: ICD-10-PCS | Mod: S$PBB,,, | Performed by: PHYSICIAN ASSISTANT

## 2019-02-04 PROCEDURE — 99999 PR PBB SHADOW E&M-EST. PATIENT-LVL V: CPT | Mod: PBBFAC,,, | Performed by: PHYSICIAN ASSISTANT

## 2019-02-04 PROCEDURE — 73562 XR KNEE ORTHO RIGHT WITH FLEXION: ICD-10-PCS | Mod: 26,59,RT, | Performed by: RADIOLOGY

## 2019-02-04 PROCEDURE — 73562 X-RAY EXAM OF KNEE 3: CPT | Mod: 26,59,RT, | Performed by: RADIOLOGY

## 2019-02-04 PROCEDURE — 99213 OFFICE O/P EST LOW 20 MIN: CPT | Mod: S$PBB,,, | Performed by: PHYSICIAN ASSISTANT

## 2019-02-04 RX ORDER — VIT C/E/ZN/COPPR/LUTEIN/ZEAXAN 250MG-90MG
1000 CAPSULE ORAL DAILY
Qty: 90 CAPSULE | Refills: 0 | COMMUNITY
Start: 2019-02-04 | End: 2019-05-05

## 2019-02-04 NOTE — TELEPHONE ENCOUNTER
Called Ms. Brown on 02/04/2019 at 1:56 pm.    Discussed the lab results in detail.    - Urinalysis is within normal limits.  - Lipid panel: Cholesterol 208. , HDL 65, - Triglycerides 110. Recommended dietary modifications: Increase fiber in diet; decrease fat intake.  - HA1C discussed. Levels 6.6.    - Patient endorses increased consumption of sweetened food recently. Counseling provided.  - Vitamin D deficiency: Levels 11. Supplements 1000 units/daily started. Also advised to increase dietary sources of  Vitamin D: Soy Milk products, and [place].    Jonathan Sanderson MD  PGY II Internal Medicine

## 2019-02-05 NOTE — PROGRESS NOTES
Subjective:      Patient ID: Ailin Brown is a 63 y.o. female.    Chief Complaint: No chief complaint on file.    HPI  63 year old female presents with chief complaint of right knee pain x 1 week. She says she fell twice. She also has h/o meniscus tear treated non-op. She reports swelling and bruising. Tylenol does not help. She has h/o CVA. Told to avoid nsaids by other doctors. She ambulates with a cane sometimes.   Patient reports right foot pain since November. She has h/o falls. In the 1990's she tore her plantar fascia. She did not have surgery for this. She reports having 2 surgeries on the foot in 2010 and 2011 in [place]. A reconstruction was one of them. She reports pain at the ball of her foot, plantar heel, and toes. It is worse with walking. Tylenol does not help her much. She wears orthopedic shoes and they help. She denies fever, chills, and sweats. She has seen several doctors for her foot. One of them told her the screws were coming out and they recommended another surgery.   Review of Systems   Constitution: Negative for chills, fever and night sweats.   Cardiovascular: Negative for chest pain.   Respiratory: Negative for cough and shortness of breath.    Hematologic/Lymphatic: Does not bruise/bleed easily.   Skin: Negative for color change.   Gastrointestinal: Negative for heartburn.   Genitourinary: Negative for dysuria.   Neurological: Negative for numbness and paresthesias.   Psychiatric/Behavioral: Negative for altered mental status.   Allergic/Immunologic: Negative for persistent infections.         Objective:            General    Vitals reviewed.  Constitutional: She is oriented to person, place, and time. She appears well-developed and well-nourished.   Cardiovascular: Normal rate.    Neurological: She is alert and oriented to person, place, and time.         Right Ankle/Foot Exam     Inspection   Erythema: absent    Range of Motion   The patient has normal right ankle ROM.  First MTP  Joint: limited and painful    Other   Sensation: normal    Comments:  TTP 1st MTP and medial calcaneal tuberosity. No warmth or erythema.         Vascular Exam     Right Pulses  Dorsalis Pedis:      2+            Right Knee Exam:  ROM 0-120 degrees  No effusion  Minimal bruising  +crepitus  TTP medial joint line      X-ray knee: ordered and reviewed by myself. Degenerative change right greater than left.  X-ray foot: reviewed by myself.   Three cancellous screws are present in the mid diaphysis of the 1st metatarsal with advanced degenerative change at the 1st MTP, similar to 02/14/2018.  The patient has undergone remote resection of the distal aspect of the proximal phalanx of the 2nd ray; cortex appears intact at this site.  This is unchanged compared to 02/14/2018.  Degenerative changes are present at other interphalangeal joints.  Lisfranc articulation is congruent.  Talar dome maintains rounded contour.  There is bulky inferior calcaneal spur and scattered calcifications overlying the distal Achilles tendon on lateral view.              Assessment:       Encounter Diagnoses   Name Primary?   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 She is not interested in having another foot surgery at this time. Prescription for custom insert given. She is interested in PT. Order was placed. Can consider knee injection in the future. RTC prn.

## 2019-02-07 ENCOUNTER — HOSPITAL ENCOUNTER (OUTPATIENT)
Dept: RADIOLOGY | Facility: CLINIC | Age: 64
Discharge: HOME | End: 2019-02-07
Attending: STUDENT IN AN ORGANIZED HEALTH CARE EDUCATION/TRAINING PROGRAM
Payer: MEDICARE

## 2019-02-07 DIAGNOSIS — N95.1 POST MENOPAUSAL SYNDROME: ICD-10-CM

## 2019-02-07 DIAGNOSIS — M85.9 DISORDER OF BONE DENSITY AND STRUCTURE, UNSPECIFIED: ICD-10-CM

## 2019-02-07 DIAGNOSIS — M89.9 DISORDER OF BONE: ICD-10-CM

## 2019-02-07 PROCEDURE — 77080 DXA BONE DENSITY AXIAL: CPT | Mod: 26,,, | Performed by: INTERNAL MEDICINE

## 2019-02-07 PROCEDURE — 77080 DXA BONE DENSITY AXIAL: CPT | Mod: TC

## 2019-02-07 PROCEDURE — 77080 DEXA BONE DENSITY SPINE HIP: ICD-10-PCS | Mod: 26,,, | Performed by: INTERNAL MEDICINE

## 2019-02-08 ENCOUNTER — PATIENT MESSAGE (OUTPATIENT)
Dept: INTERNAL MEDICINE | Facility: CLINIC | Age: 64
End: 2019-02-08

## 2019-02-12 ENCOUNTER — OFFICE VISIT (OUTPATIENT)
Dept: INTERNAL MEDICINE | Facility: CLINIC | Age: 64
End: 2019-02-12
Payer: MEDICARE

## 2019-02-12 VITALS
HEIGHT: 64 IN | OXYGEN SATURATION: 99 % | HEART RATE: 61 BPM | DIASTOLIC BLOOD PRESSURE: 76 MMHG | BODY MASS INDEX: 40.27 KG/M2 | WEIGHT: 235.88 LBS | SYSTOLIC BLOOD PRESSURE: 130 MMHG

## 2019-02-12 DIAGNOSIS — R10.9 ABDOMINAL PAIN, UNSPECIFIED ABDOMINAL LOCATION: Primary | ICD-10-CM

## 2019-02-12 PROCEDURE — 99213 PR OFFICE/OUTPT VISIT, EST, LEVL III, 20-29 MIN: ICD-10-PCS | Mod: S$PBB,GC,, | Performed by: STUDENT IN AN ORGANIZED HEALTH CARE EDUCATION/TRAINING PROGRAM

## 2019-02-12 PROCEDURE — 99999 PR PBB SHADOW E&M-EST. PATIENT-LVL V: ICD-10-PCS | Mod: PBBFAC,GC,, | Performed by: STUDENT IN AN ORGANIZED HEALTH CARE EDUCATION/TRAINING PROGRAM

## 2019-02-12 PROCEDURE — 99215 OFFICE O/P EST HI 40 MIN: CPT | Mod: PBBFAC | Performed by: STUDENT IN AN ORGANIZED HEALTH CARE EDUCATION/TRAINING PROGRAM

## 2019-02-12 PROCEDURE — 99999 PR PBB SHADOW E&M-EST. PATIENT-LVL V: CPT | Mod: PBBFAC,GC,, | Performed by: STUDENT IN AN ORGANIZED HEALTH CARE EDUCATION/TRAINING PROGRAM

## 2019-02-12 PROCEDURE — 99213 OFFICE O/P EST LOW 20 MIN: CPT | Mod: S$PBB,GC,, | Performed by: STUDENT IN AN ORGANIZED HEALTH CARE EDUCATION/TRAINING PROGRAM

## 2019-02-12 NOTE — PROGRESS NOTES
"Internal Medicine Clinic Note  2019    Subjective:       Patient ID: Ailin Brown is a 63 y.o. female being seen for urgent care appointment.     Chief Complaint: Abdominal Pain    HPI   Ailin Brown is a 63 y.o. F with asthma, T2DM, HTN, PAfib, SSS s/p PPM in place, TIA, who presents with abdominal bloating and pain. She describes a mechanical fall on  when she was getting out of the shower. Starting the next day, she has experienced RUQ sharp pain that start at the back and radiates to the front. Associated symptoms include loss of appetite and increased satiety. She is also having diarrhea starting 2/3/19 till now which she describes as loose light-brown stools. Bowel movements are not related to food intake. She denies any blood or melena. She also denies any fevers, chills, unintentional weight loss, prior occurrence of such pain, dysuria, hematuria. She is up to date on her preventative cancer screen (see medical history below).    She also denies any recent travel, exposure to sick contacts, change in diet or meds.    Notable history:   H/o ovarian "chocolate" cysts  H/o , history of fibroids. Per review of records from [facility], appears to have menopause in  and a normal pap smear last year (10/4/2018)    Past Medical History   Type 2 diabetes mellitus without complication, without long-term current use of insulin    Neurological Lyme disease/encephalopathy- diagnosed in    Insomnia - 2015    Dry eyes - 2015    Encephalopathy chronic - 2014    Metatarsal fracture; Right 5th Prox - 10/03/2013    Sick sinus syndrome s/p PPM  11/15/2011    Cataract, nuclear sclerotic senile - 2011    Pacemaker reprogramming/check - 2011    Hallux rigidus - 2010 S/P bunionectomy .   Hypercholesteremia - 2009    Vitreous degeneration-  2008    Hypertension, essential - 2005    Colonic polyps - 9/10/2004   Colonoscopy " 8/30/04, one colon polyp (1 adenoma).   Colonoscopy 5/2/07, few diverticula, hemorrhoids.   Colonoscopy 4/12/11, one colon polyp (1 adenoma), few diverticula, hemorrhoids, random biopsies (normal).   [facility]/6/16 ([facility]), 3 colon polyps (1 tubular adenoma, 2 lymphoid aggregates), few diverticula, hemorrhoids. Repeat colonoscopy in 5 years.   Mammogram - 10/3/18   CORNEAL DYSTROPHY (mild map-dot) [H18.50] 03/12/2002    Ventricular tachycardia    Asthma - 05/04/1998    Seizure - 1982, single episode, none since - 06/04/1982        Past Surgical History:   Procedure Laterality Date    ATRIAL CARDIAC PACEMAKER INSERTION      CARDIAC PACEMAKER PLACEMENT      FOOT SURGERY       Family History   Problem Relation Age of Onset    Retinal detachment Mother     Blindness Brother     Amblyopia Brother     Macular degeneration Brother     Retinal detachment Brother     Glaucoma Father     Glaucoma Paternal Grandmother     Blindness Paternal Grandmother     Cataracts Neg Hx     Strabismus Neg Hx      Social History     Socioeconomic History    Marital status:      Spouse name: None    Number of children: None    Years of education: None    Highest education level: None   Social Needs    Financial resource strain: None    Food insecurity - worry: None    Food insecurity - inability: None    Transportation needs - medical: None    Transportation needs - non-medical: None   Occupational History    None   Tobacco Use    Smoking status: Never Smoker    Smokeless tobacco: Never Used   Substance and Sexual Activity    Alcohol use: No    Drug use: No    Sexual activity: No   Other Topics Concern    None   Social History Narrative    None     Patient's Medications   New Prescriptions    No medications on file   Previous Medications    ACCU-CHEK MORENO PLUS TEST STRP STRP    TEST BLOOD GLUCOSE ONCE DAILY OR AS DIRECTED    ALBUTEROL (PROAIR HFA) 90 MCG/ACTUATION INHALER    Inhale 2 puffs into  the lungs every 6 (six) hours as needed for Wheezing. Rescue    ASPIRIN 325 MG TABLET    Take 325 mg by mouth once daily.    ATENOLOL (TENORMIN) 25 MG TABLET    TK 1 T PO D    BECLOMETHASONE (QVAR) 40 MCG/ACTUATION AERO    Inhale 2 puffs into the lungs.    BLOOD SUGAR DIAGNOSTIC (BLOOD GLUCOSE TEST) STRP    Accu-Chek Compact Plus Test Strips   USE ONCE D TO TEST BLOOD SUGAR    ALON GLUCONATE/ALON LACTATE/GUM (GUAR GUM-CALCIUM ORAL)    Take by mouth once daily.    CARBOXYMETHYLCELLULOSE (REFRESH PLUS) 0.5 % DPET    1 drop 3 (three) times daily as needed.    CHOLECALCIFEROL, VITAMIN D3, (VITAMIN D3) 1,000 UNIT CAPSULE    Take 1 capsule (1,000 Units total) by mouth once daily.    EPINEPHRINE (EPIPEN JR) 0.15 MG/0.3 ML PEN INJECTION    Inject 0.15 mg into the muscle as needed for Anaphylaxis.    FENOFIBRATE MICRONIZED 30 MG CAP    Take 30 mg by mouth once daily.    FEXOFENADINE (ALLEGRA) 180 MG TABLET    Take 180 mg by mouth once daily.     GLIMEPIRIDE (AMARYL) 2 MG TABLET    TK 1 T PO D WITH XENA    LOSARTAN (COZAAR) 25 MG TABLET    TK 1 T PO BID    UNABLE TO FIND    Take 1 tablet by mouth nightly as needed. Leg Cramp     ZOLPIDEM (AMBIEN) 10 MG TAB       Modified Medications    No medications on file   Discontinued Medications    No medications on file       Review of Systems   Constitutional: Negative for chills, fever and weight loss.   HENT: Negative for congestion, hearing loss, sore throat and tinnitus.    Eyes: Negative for blurred vision and double vision.   Respiratory: Negative for cough, sputum production, shortness of breath and wheezing.    Cardiovascular: Negative for chest pain, palpitations, claudication and leg swelling.   Gastrointestinal: Positive for abdominal pain and diarrhea. Negative for constipation, heartburn, nausea and vomiting.   Genitourinary: Negative for dysuria, frequency, hematuria and urgency.   Musculoskeletal: Negative for back pain, joint pain and myalgias.   Skin: Negative for rash.  "  Neurological: Negative for dizziness, tingling, weakness and headaches.   Psychiatric/Behavioral: Negative for depression. The patient is not nervous/anxious.      Objective:      /76 (BP Location: Left arm, Patient Position: Sitting, BP Method: Large (Manual))   Pulse 61   Ht 5' 4" (1.626 m)   Wt 107 kg (235 lb 14.3 oz)   SpO2 99%   BMI 40.49 kg/m²   Body mass index is 40.49 kg/m².    Physical Exam   Constitutional: She is oriented to person, place, and time. She appears well-developed and well-nourished.   HENT:   Head: Normocephalic and atraumatic.   Eyes: Pupils are equal, round, and reactive to light.   Neck: Normal range of motion. No JVD present.   Cardiovascular: Normal rate, regular rhythm, normal heart sounds and intact distal pulses. Exam reveals no gallop and no friction rub.   No murmur heard.  She has a pacemaker in place   Pulmonary/Chest: Effort normal. No respiratory distress. She has no wheezes. She has no rales.   Abdominal: Soft. Bowel sounds are normal. She exhibits no distension. There is no tenderness.   Tenderness to palpation under right rib cage, from the front and back side.   Musculoskeletal: Normal range of motion.   Lymphadenopathy:     She has no cervical adenopathy.   Neurological: She is alert and oriented to person, place, and time.   Skin: Skin is warm and dry. Capillary refill takes less than 2 seconds.   Psychiatric: She has a normal mood and affect.   Nursing note and vitals reviewed.      Assessment:         1. Abdominal pain, unspecified abdominal location  US Abdomen Complete    Lipase    TISSUE TRANSGLUTAMINASE, IGA    Endomysial (EMA) Antibody (IgG) Titer         Plan:         Ailin Brown is a 63 y.o. female being seen for an urgent care visit for abdominal pain. Given the diarrhea and 1+ week history, it is not an acute abdomen. She does have pain, but no red flag symptoms to warrant CT initially. She also has an allergy to contrast. Reviewed labs. Will " start with US    1. Abdominal Pain  - concern for GI vs.  vs. Musculoskeletal etiology. Ultrasound of abdomen ordered.  - GI: lipase and test for celiac sprue with anti-tTG and EMA-Ab, consipation  - : concern for ovarian cyst rupture (althought that would be more consistent with acute abdomen), ovarian cancer (will look for ascites; but no family history in 1st degree relatives, systemic B symptoms), fibroids  - if all of the above workup is negative, can attribute to musculoskeletal pain s/p fall and treat supportively.    RTC in 1 week to follow up on results and symptoms. Pt notified that if symptoms were to worsen acutely, she should go to the ER.    Patient seen and plan of care discussed with Dr. Haider Stubbs MD  Internal Medicine, PGY-3  [phone]

## 2019-02-12 NOTE — PROGRESS NOTES
I have reviewed the notes, assessments, and/or procedures performed by Dr. Sanderson, I concur with her documentation of Ailin Brown.

## 2019-02-13 ENCOUNTER — HOSPITAL ENCOUNTER (OUTPATIENT)
Dept: RADIOLOGY | Facility: HOSPITAL | Age: 64
Discharge: HOME | End: 2019-02-13
Attending: STUDENT IN AN ORGANIZED HEALTH CARE EDUCATION/TRAINING PROGRAM
Payer: MEDICARE

## 2019-02-13 ENCOUNTER — TELEPHONE (OUTPATIENT)
Dept: INTERNAL MEDICINE | Facility: CLINIC | Age: 64
End: 2019-02-13

## 2019-02-13 DIAGNOSIS — R10.9 ABDOMINAL PAIN, UNSPECIFIED ABDOMINAL LOCATION: ICD-10-CM

## 2019-02-13 PROCEDURE — 76700 US EXAM ABDOM COMPLETE: CPT | Mod: 26,,, | Performed by: RADIOLOGY

## 2019-02-13 PROCEDURE — 76700 US EXAM ABDOM COMPLETE: CPT | Mod: TC

## 2019-02-13 PROCEDURE — 76700 US ABDOMEN COMPLETE: ICD-10-PCS | Mod: 26,,, | Performed by: RADIOLOGY

## 2019-02-13 NOTE — TELEPHONE ENCOUNTER
I called Ailin Brown and informed her of results: lipase wnl and abdomen ultrasound wnl. Her pain is likely due to musculoskeletal etiology from the fall. I encouraged her to use supportive management with tylenol, ice packs, ibuprofen etc. She has been on anticoagulation before and denies h/o GI ulcers or bleeds. She will let us know in 1 - week whether she is feeling better or worse.    Frannie Stubbs MD  Internal Medicine, PGY-3  [phone]

## 2019-02-15 ENCOUNTER — OFFICE VISIT (OUTPATIENT)
Dept: OPTOMETRY | Facility: CLINIC | Age: 64
End: 2019-02-15
Payer: MEDICARE

## 2019-02-15 DIAGNOSIS — H10.32 ACUTE BACTERIAL CONJUNCTIVITIS OF LEFT EYE: Primary | ICD-10-CM

## 2019-02-15 PROCEDURE — 92012 INTRM OPH EXAM EST PATIENT: CPT | Mod: S$PBB,,, | Performed by: OPTOMETRIST

## 2019-02-15 PROCEDURE — 99999 PR PBB SHADOW E&M-EST. PATIENT-LVL II: ICD-10-PCS | Mod: PBBFAC,,, | Performed by: OPTOMETRIST

## 2019-02-15 PROCEDURE — 99999 PR PBB SHADOW E&M-EST. PATIENT-LVL II: CPT | Mod: PBBFAC,,, | Performed by: OPTOMETRIST

## 2019-02-15 PROCEDURE — 92012 PR EYE EXAM, EST PATIENT,INTERMED: ICD-10-PCS | Mod: S$PBB,,, | Performed by: OPTOMETRIST

## 2019-02-15 PROCEDURE — 99212 OFFICE O/P EST SF 10 MIN: CPT | Mod: PBBFAC,PO | Performed by: OPTOMETRIST

## 2019-02-15 RX ORDER — NEOMYCIN SULFATE, POLYMYXIN B SULFATE AND DEXAMETHASONE 3.5; 10000; 1 MG/ML; [USP'U]/ML; MG/ML
1 SUSPENSION/ DROPS OPHTHALMIC 4 TIMES DAILY
Qty: 5 ML | Refills: 0 | Status: SHIPPED | OUTPATIENT
Start: 2019-02-15 | End: 2019-02-22

## 2019-02-15 NOTE — PROGRESS NOTES
HPI     Red eye left since yesterday afternoon  Mucous production also  No associated vision changes, filmy  Using warm compress  No help with OTC or ALaway    Last edited by Yoan Phelps, OD on 2/15/2019  3:35 PM. (History)            Assessment /Plan     For exam results, see Encounter Report.    Acute bacterial conjunctivitis of left eye  -     neomycin-polymyxin-dexamethasone (MAXITROL) 3.5mg/mL-10,000 unit/mL-0.1 % DrpS; Place 1 drop into the left eye 4 (four) times daily. for 7 days  Dispense: 5 mL; Refill: 0      1. Start Maxitrol drops 4x/day x 1 week. RTC 1 week if not resolved.

## 2019-02-15 NOTE — MEDICAL/APP STUDENT
Pt presents with red eye OS. Left upper lid is swollen. Reports having mucous OS with crust around eye/face, notices film over vision. Some improvement with warm compress. (+) dull pain. (+) FBS. Vision became very blurry. Onset yesterday afternoon. Has not happened before. Has been sick recently. Using Alaway OU qhs and PF Refresh Optive OU 5-6 tubes/day. Some tearing OD.   AZAEL: 5/2017 with Claude Masterson from June/July  H/o shingles  PMH: diabetic   FOH: mother and brother had retinal detachment, father and paternal grandmother had glaucoma

## 2019-02-20 ENCOUNTER — CLINICAL SUPPORT (OUTPATIENT)
Dept: REHABILITATION | Facility: OTHER | Age: 64
End: 2019-02-20
Payer: MEDICARE

## 2019-02-20 DIAGNOSIS — G89.29 CHRONIC PAIN OF RIGHT KNEE: ICD-10-CM

## 2019-02-20 DIAGNOSIS — M25.561 CHRONIC PAIN OF RIGHT KNEE: ICD-10-CM

## 2019-02-20 DIAGNOSIS — R26.89 BALANCE PROBLEMS: ICD-10-CM

## 2019-02-20 DIAGNOSIS — R26.81 UNSTEADY GAIT: ICD-10-CM

## 2019-02-20 DIAGNOSIS — M25.552 LEFT HIP PAIN: ICD-10-CM

## 2019-02-20 DIAGNOSIS — M79.671 RIGHT FOOT PAIN: ICD-10-CM

## 2019-02-20 DIAGNOSIS — Z91.81 HISTORY OF FALLING: ICD-10-CM

## 2019-02-20 PROCEDURE — 97163 PT EVAL HIGH COMPLEX 45 MIN: CPT | Mod: PN | Performed by: PHYSICAL THERAPIST

## 2019-02-20 NOTE — PLAN OF CARE
[facility]  Physical Therapy Initial Evaluation    Name: Ailin Brown  Clinic Number: [id number]    Therapy Diagnosis:   Encounter Diagnoses   Name Primary?   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 Reports multiple falls with history of multiple broken bones. Most recent fall was this past week. Had a bad fall 1/29/19 getting out of bathtub. Multiple falls due to tripping, slipping, sometimes has had buckling of R knee. Hx of bucket tear to R meniscus from fall several years ago. Multiple fractures and surgery to R foot. Numbness to B feet due to diabetes       Past Medical History:   Diagnosis Date    Asthma     Cataract     Diabetes mellitus     Fibroids     Hypertension     PAF (paroxysmal atrial fibrillation)     Sick sinus syndrome     WPW (Jennifer-Parkinson-White syndrome)      Ailin Brown  has a past surgical history that includes Cardiac pacemaker placement; Foot surgery; and Atrial cardiac pacemaker insertion.    Ailin has a current medication list which  "includes the following prescription(s): accu-chek gale plus test strp, albuterol, aspirin, atenolol, beclomethasone, blood sugar diagnostic, sloan gluconate/sloan lactate/gum, carboxymethylcellulose, cholecalciferol (vitamin d3), epinephrine, fenofibrate micronized, fexofenadine, glimepiride, losartan, neomycin-polymyxin-dexamethasone, UNABLE TO FIND, and zolpidem, and the following Facility-Administered Medications: albuterol.    Review of patient's allergies indicates:   Allergen Reactions    Iodinated contrast- oral and iv dye Anaphylaxis    Shellfish containing products Anaphylaxis    Codeine Other (See Comments)    Iodine     Latex     Shellfish derived     Sulfa (sulfonamide antibiotics) Other (See Comments)        Imaging, X-ray R foot: FINDINGS:  "On today's study the pain marker is directed at the distal aspect of the 1st ray.  Three cancellous screws are present in the mid diaphysis of the 1st metatarsal with advanced degenerative change at the 1st MTP, similar to 02/14/2018.  The patient has undergone remote resection of the distal aspect of the proximal phalanx of the 2nd ray; cortex appears intact at this site.  This is unchanged compared to 02/14/2018.  Degenerative changes are present at other interphalangeal joints.  Lisfranc articulation is congruent.  Talar dome maintains rounded contour.  There is bulky inferior calcaneal spur and scattered calcifications overlying the distal Achilles tendon on lateral view."    X-ray Knees: FINDINGS:  There is mild narrowing of the lateral femoral tibial compartments.  Small osteophytes on the right.  There is significant narrowing of the right lateral compartment on the flexion view.  No effusion.  IMPRESSION: Degenerative change right greater than left.         Prior Therapy: history of therapy over the years with falls, none recently  Social History: Pt lives with their partner in elevated home  Occupation: not working (disabled)  Prior Level of Function: " Independent with ADL's and driving  Current Level of Function: Independent with ADL's. Able to drive but avoids slamming on breaks. Reports difficulty with donning socks due to pain/tightness at hips. Difficulty getting in/out of bathtub.    Pain:  Current 3/10, worst 10/10, best 3/10   Location: R foot (plantar surface), R knee, L hip  Description: Knee pain is sharp and aching; hip and foot is aching  Aggravating Factors: stairs, twisting, reaching, walking  Easing Factors: rest and Ibuprofen    Pts goals: improve balance and gait, be stronger going up and down stairs.     Objective     Gait: Pt ambulates with decreased step length B and increased CHYNA. No AD used in clinic today, pt reports use of SPC often    MMT Left Right    Ankle Plantarflexion 2/5 2/5    Ankle Dorsiflexion 4/5 4+/5    Ankle Inversion 4/5 4+/5    Ankle Eversion 4/5 4+/5    Knee Extension 4+/5 4+/5    Knee Flexion 4+/5 4+/5    Hip Flexion 4-/5 4-/5    Hip External Rotation 4-/5 4/5    Hip Internal Rotation 4-/5 4/5          Balance:  Godfrey Assessment    1. Sitting to Standing   3 - able to stand independely using hands  2. Standing Unsupported   3 - able to stand 2 minutes with supervision  3. Sitting Unsupported   4 - able to sit safely and securely 2 minutes  4. Standing to Sitting   4 - sits safely with minimal use of hands  5. Pivot Transfer   3 - able to transfer safely with definite use of hands  6. Standing with Eyes Closed   3 - able to stand 10 seconds with supervision  7. Standing with Feet Together   3 - able to place feet together independently and stand for 1 minute with supervision  8. Reaching Forward with Outstretched Arm   3 - can reach forward 12 cm/5 inches safely  9. Retrieving Object from Floor   3 - able to pick slipper but needs supervision  10. Turning to Look Behind   3 - looks behind one side only, other side less weight shift  11. Turning 360 Degrees   4 - able to turn 360 in seconds or less  12. Placing Alternate  Foot on Step   4 - able to stand independently safely and complete 8 steps in 20 seconds  13. Standing with One Foot in Front   3 - able to place foot ahead of other independently and hold 30 seconds  14. Standing on One Foot   3- able to lift leg independently and hold 5-10 seconds  Total: 46  Maximum: 56      CMS Impairment/Limitation/Restriction for FOTO Knee Survey    Therapist reviewed FOTO scores for Ailin Brown on 2/20/2019.   FOTO documents entered into EPIC - see Media section.    Limitation Score: 74%  Category: Mobility    Current : CL = least 60% but < 80% impaired, limited or restricted  Goal: CK = at least 40% but < 60% impaired, limited or restricted  Discharge: n/a           CMS Impairment/Limitation/Restriction for FOTO Foot Survey    Therapist reviewed FOTO scores for Ailni Brown on 2/20/2019.   FOTO documents entered into EPIC - see Media section.    Limitation Score: 78%  Category: Mobility    Current : CL = least 60% but < 80% impaired, limited or restricted  Goal: CL = least 60% but < 80% impaired, limited or restricted  Discharge: n/a           TREATMENT   Treatment Time In: n/a  Treatment Time Out: n/a  Total Treatment time separate from Evaluation: n/a minutes  No treatment initiated today    Patient Education Provided    Education provided:   - Therapy rationale and plan of care. Pt educated regarding option of aquatic intervention if treatment on land is not tolerable. Advised to look into installation of grab bar to increase safety with getting in/out of bathtub.      Assessment   Ailin is a 63 y.o. female referred to outpatient Physical Therapy with a medical diagnosis of M17.11 (ICD-10-CM) - Primary osteoarthritis of right knee M79.671 (ICD-10-CM) - Right foot pain M72.2 (ICD-10-CM) - Plantar fasciitis, right M19.079 (ICD-10-CM) - 1st MTP arthritis. Pt presents with complex history and multiple complaints. History of plantar fasciitis, fasciiotomy, surgery and multiple  fractures to R foot. History of chronic meniscal tear to R knee. Reports bursal tear/injury to L hip. Reports frequent falls. History of decreased protective sensation to B feet (R>L) due to diabetic neuropathy. Weakness of B ankles and hips with MMT. Decreased balance indicated with Godfrey Balance scale. Pt is at significant risk for falls given history of falling, reported poor environmental awareness, and diminished protective sensation.     Pt prognosis is Fair.   Pt will benefit from skilled outpatient Physical Therapy to address the deficits stated above and in the chart below, provide pt/family education, and to maximize pt's level of independence.     Plan of care discussed with patient: Yes  Pt's spiritual, cultural and educational needs considered and patient is agreeable to the plan of care and goals as stated below:     Anticipated Barriers for therapy: multiple co-morbidities, mobility limitations    Medical Necessity is demonstrated by the following  History  Co-morbidities and personal factors that may impact the plan of care Co-morbidities:   multiple fractures to lower body, multiple cardiac issues and surgeries, hx surgeries to R foot, meniscal tear to R knee, injury to L hip    Personal Factors:   no deficits     high   Examination  Body Structures and Functions, activity limitations and participation restrictions that may impact the plan of care Body Regions:   lower extremities  trunk    Body Systems:    ROM  strength  balance  gait  transfers    Participation Restrictions:   History of multiple, frequent falls; difficulty with stairs; difficulty getting in/out tub; difficulty walking; poor tolerance to community mobility; difficulty with mobility at home     Activity limitations:   Learning and applying knowledge  no deficits    General Tasks and Commands  no deficits    Communication  no deficits    Mobility  walking  driving (bike, car, motorcycle)    Self care  washing oneself (bathing,  drying, washing hands)  dressing    Domestic Life  doing house work (cleaning house, washing dishes, laundry)    Interactions/Relationships  no deficits    Life Areas  no deficits    Community and Social Life  community life  recreation and leisure         high   Clinical Presentation unstable clinical presentation with unpredictable characteristics high   Decision Making/ Complexity Score: high     Goals:  .    Plan   Plan of care Certification: 2/20/2019 to 4/5/2019.    Outpatient Physical Therapy 2 times weekly for 6 weeks to include the following interventions: Gait Training, Manual Therapy, Moist Heat/ Ice, Neuromuscular Re-ed, Patient Education and Therapeutic Exercise. If pt does not tolerate therapy on land she may benefit from transition to aquatic therapy.    Cayla Clark, PT

## 2019-02-20 NOTE — PROGRESS NOTES
I have reviewed the notes, assessments, and/or procedures performed by Dr. Stubbs, I concur with her documentation of Ailin Brown.

## 2019-02-25 ENCOUNTER — OFFICE VISIT (OUTPATIENT)
Dept: INTERNAL MEDICINE | Facility: CLINIC | Age: 64
End: 2019-02-25
Payer: MEDICARE

## 2019-02-25 ENCOUNTER — TELEPHONE (OUTPATIENT)
Dept: INTERNAL MEDICINE | Facility: CLINIC | Age: 64
End: 2019-02-25

## 2019-02-25 ENCOUNTER — NURSE TRIAGE (OUTPATIENT)
Dept: ADMINISTRATIVE | Facility: CLINIC | Age: 64
End: 2019-02-25

## 2019-02-25 VITALS
HEIGHT: 64 IN | BODY MASS INDEX: 39.9 KG/M2 | WEIGHT: 233.69 LBS | DIASTOLIC BLOOD PRESSURE: 64 MMHG | HEART RATE: 69 BPM | SYSTOLIC BLOOD PRESSURE: 110 MMHG | OXYGEN SATURATION: 98 % | TEMPERATURE: 98 F

## 2019-02-25 DIAGNOSIS — R19.7 DIARRHEA, UNSPECIFIED TYPE: Primary | ICD-10-CM

## 2019-02-25 DIAGNOSIS — R10.84 GENERALIZED ABDOMINAL PAIN: ICD-10-CM

## 2019-02-25 DIAGNOSIS — R10.30 LOWER ABDOMINAL PAIN: ICD-10-CM

## 2019-02-25 DIAGNOSIS — K57.30 DIVERTICULOSIS OF LARGE INTESTINE WITHOUT HEMORRHAGE: ICD-10-CM

## 2019-02-25 PROCEDURE — 99214 PR OFFICE/OUTPT VISIT, EST, LEVL IV, 30-39 MIN: ICD-10-PCS | Mod: S$PBB,,, | Performed by: NURSE PRACTITIONER

## 2019-02-25 PROCEDURE — 99999 PR PBB SHADOW E&M-EST. PATIENT-LVL V: ICD-10-PCS | Mod: PBBFAC,,, | Performed by: NURSE PRACTITIONER

## 2019-02-25 PROCEDURE — 99214 OFFICE O/P EST MOD 30 MIN: CPT | Mod: S$PBB,,, | Performed by: NURSE PRACTITIONER

## 2019-02-25 PROCEDURE — 99999 PR PBB SHADOW E&M-EST. PATIENT-LVL V: CPT | Mod: PBBFAC,,, | Performed by: NURSE PRACTITIONER

## 2019-02-25 PROCEDURE — 99215 OFFICE O/P EST HI 40 MIN: CPT | Mod: PBBFAC | Performed by: NURSE PRACTITIONER

## 2019-02-25 RX ORDER — DICYCLOMINE HYDROCHLORIDE 10 MG/1
10 CAPSULE ORAL 2 TIMES DAILY
Qty: 14 CAPSULE | Refills: 0 | Status: SHIPPED | OUTPATIENT
Start: 2019-02-25 | End: 2019-03-04

## 2019-02-25 NOTE — PATIENT INSTRUCTIONS
"  [place] Diet  Your healthcare provider may recommend a bland diet if you have an upset stomach. It consists of foods that are mild and easy to digest. It is better to eat small frequent meals rather than 3 large meals a day.    Beverages  OK: Fruit juices, non-caffeinated teas and coffee, non-carbonated katz  Avoid: Carbonated beverage, caffeinated tea and coffee, all alcoholic beverages  Bread  OK: Refined white, wheat or rye bread, hong or soda crackers, Celine toast, plain rolls, bagels  Avoid: Whole-grain bread  Cereal  OK: Refined cereals: cooked or ready to eat  Avoid: Whole-grain cereals and granola, or those containing bran, seeds or nuts  Desserts  OK: Peanut butter and all others except those to "avoid"  Avoid: Chocolate, cocoa, coconut, popcorn, nuts, seeds, jam, marmalade  Fruits  OK: Canned, cooked, frozen or fresh fruits without seeds or tough skin  Avoid: Olives, skin and seeds of fruit  Meats  OK: All fresh or preserved meat, fish and fowl  Avoid: Any that are prepared with those spices to "avoid"  Cheese and eggs  OK: Eggs, cottage cheese, cream cheese, other cheeses  Avoid: All cheeses made with those spices to "avoid"  Potatoes and pasta  OK: Potato, rice, macaroni, noodles, spaghetti  Avoid: None  Soups  OK: All soups without heavy seasoning  Avoid: Soups made with those spices to "avoid"  Vegetables  OK: Canned, cooked, fresh or frozen mildly flavored vegetables without seeds, skins or coarse fiber  Avoid: Vegetables prepared with those spices to "avoid"; skin and seeds of vegetables and those with coarse fiber  Spices  OK: Salt, lemon and lime juice, vinegar, all extracts, anna, cinnamon, thyme, mace, allspice, paprika  Avoid: Chili powder, cloves, pepper, seed spices, garlic, gravy pickles, highly seasoned salad dressings  Date Last Reviewed: 11/20/2015  © 8082-3148 [employer]. [address] [place] [address]. All rights reserved. This information is not intended as " a substitute for professional medical care. Always follow your healthcare professional's instructions.

## 2019-02-25 NOTE — TELEPHONE ENCOUNTER
Was told to go to er , unable to contact her phone ,   Spoke with pt this is a phone  Was dropped so she is using this phone for am ,   The options are to go to er dr Stubbs is not in clinic no appointment available until wednesday,  She will keep appointment with geovanna

## 2019-02-25 NOTE — TELEPHONE ENCOUNTER
Advice Only     Eula Kirkland, AN Stubbs MD; Maynor Kessler Staff 1 hour ago (9:36 AM)      Ailin called to say she was told to call Dr Frannie Stubbs back if her abdominal pain did not improve.  She states it has not, and she still has diarrhea.  States pain is 9/10 pain scale.  Suggested ED for evaluation of severe pain.  She declines emphatically. She wants to be seen today in clinic only.  Declined triage by phone, just wants appt.  Appt made with Victoria Mcguire NP, for this evening. Explained to her that if symptoms worsen, she is strongly advised to go to ED. States understanding.  Please contact caller directly with any additional care advice.    Routing Comment       Ailin Brown [phone]  Eula Kirkland RN 2 hours ago (9:11 AM)

## 2019-02-25 NOTE — TELEPHONE ENCOUNTER
Reason for Disposition   Patient wants to be seen    Protocols used: ST NO PROTOCOL CALL: SICK ADULT-A-[place]    Ailin called to say she was told to call Dr Frannie Stubbs back if her abdominal pain did not improve.  She states it has not, and she still has diarrhea.  States pain is 9/10 pain scale.  Suggested ED for evaluation of severe pain.  She declines emphatically. She wants to be seen today in clinic only.  Declined triage by phone, just wants appt.  Appt made with Victoria Mcguire NP, for this evening. Explained to her that if symptoms worsen, she is strongly advised to go to ED. States understanding.  Please contact caller directly with any additional care advice.

## 2019-02-26 ENCOUNTER — PATIENT MESSAGE (OUTPATIENT)
Dept: INTERNAL MEDICINE | Facility: CLINIC | Age: 64
End: 2019-02-26

## 2019-02-26 ENCOUNTER — HOSPITAL ENCOUNTER (OUTPATIENT)
Dept: RADIOLOGY | Facility: HOSPITAL | Age: 64
Discharge: HOME | End: 2019-02-26
Attending: NURSE PRACTITIONER
Payer: MEDICARE

## 2019-02-26 DIAGNOSIS — R10.84 GENERALIZED ABDOMINAL PAIN: ICD-10-CM

## 2019-02-26 PROCEDURE — 74176 CT ABD & PELVIS W/O CONTRAST: CPT | Mod: TC

## 2019-02-26 PROCEDURE — 74176 CT ABDOMEN PELVIS WITHOUT CONTRAST: ICD-10-PCS | Mod: 26,,, | Performed by: RADIOLOGY

## 2019-02-26 PROCEDURE — 74176 CT ABD & PELVIS W/O CONTRAST: CPT | Mod: 26,,, | Performed by: RADIOLOGY

## 2019-02-26 NOTE — PROGRESS NOTES
"Subjective:       Patient ID: Ailin Brown is a 63 y.o. female.    Chief Complaint: Diarrhea (right side pain, nasuea)    Ms. Brown presents for right sided lower abdominal pain and diarrhea x about 6 weeks. She denies any antibiotic use in the past 12 months. She has watery diarrhea 3-8 times daily. About 1 week ago she took pepto bismol.  She took 10 doses over the course of the day. This resolved the diarrhea for 48 hours, during which she had no bowel movements. Then the diarrhea returned. She had an abdominal us recently that showed hepatic steatosis, normal gallbladder, normal renal system. She recently relocated from [place]. Her previous colonoscopies are viewable in "care everywhere," have have shown diverticulosis without diverticulitis. She eats a diet high in fruits and vegetables. She eats red meat once to twice weekly (her partner says it is more often than this). She is diabetic, on glimepiride, last A1c 6.6. She aslo has HTN, HLD, a history of a fib, and asthma. She has a pacemaker. She is not currently on an anticoagulant. She reports mental fogginess and forgetfulness which she feels is worsening.       Review of Systems   Constitutional: Positive for fatigue. Negative for fever.   HENT: Negative for facial swelling.    Eyes: Negative for visual disturbance.   Respiratory: Negative for shortness of breath.    Cardiovascular: Negative for chest pain.   Gastrointestinal: Positive for abdominal pain, diarrhea and nausea. Negative for vomiting.   Genitourinary: Negative for dysuria, flank pain and frequency.   Musculoskeletal: Negative for gait problem.   Skin: Negative for rash.   Neurological: Negative for headaches.   Psychiatric/Behavioral: Negative for confusion.       Objective:      Physical Exam   Constitutional: She is oriented to person, place, and time. She appears well-developed. No distress.   Morbidly obese   HENT:   Head: Normocephalic.   Eyes: No scleral icterus.   Neck: Normal " range of motion. Neck supple.   Cardiovascular: Normal rate, regular rhythm and normal heart sounds.   Pulmonary/Chest: Effort normal and breath sounds normal. No stridor. No respiratory distress. She has no wheezes. She has no rales.   Abdominal: Soft. Bowel sounds are normal. She exhibits no distension and no mass. There is tenderness. There is no rebound and no guarding.   Referred pain in RLQ when palpating RUQ. RUQ and suprapubic region are tender to palpation    Musculoskeletal: She exhibits no edema.   Neurological: She is alert and oriented to person, place, and time.   Skin: Skin is warm and dry. She is not diaphoretic. There is pallor.   Psychiatric: She has a normal mood and affect. Her behavior is normal.   Nursing note and vitals reviewed.      Assessment:       1. Diarrhea, unspecified type    2. Lower abdominal pain    3. Generalized abdominal pain    4. Diverticulosis of large intestine without hemorrhage        Plan:   1. Diarrhea, unspecified type  - Stool culture; Future  - Clostridium difficile EIA; Future  - Stool Exam-Ova,Cysts,Parasites; Future    2. Lower abdominal pain  - URINALYSIS; Future  - Urine culture; Future    3. Generalized abdominal pain  - CT Abdomen Pelvis  Without Contrast; Future    4. Diverticulosis of large intestine without hemorrhage  - dicyclomine (BENTYL) 10 MG capsule; Take 1 capsule (10 mg total) by mouth 2 (two) times daily. for 7 days  Dispense: 14 capsule; Refill: 0      Pt has been given instructions populated from [employer] database and has verbalized understanding of the after visit summary and information contained wherein.    Follow up with a primary care provider. May go to ER for acute shortness of breath, lightheadedness, fever, or any other emergent complaints or changes in condition.

## 2019-03-06 ENCOUNTER — OFFICE VISIT (OUTPATIENT)
Dept: OPHTHALMOLOGY | Facility: CLINIC | Age: 64
End: 2019-03-06
Payer: MEDICARE

## 2019-03-06 DIAGNOSIS — H10.9 BACTERIAL CONJUNCTIVITIS OF BOTH EYES: Primary | ICD-10-CM

## 2019-03-06 DIAGNOSIS — H04.123 DRY EYE SYNDROME OF BOTH EYES: ICD-10-CM

## 2019-03-06 DIAGNOSIS — B96.89 BACTERIAL CONJUNCTIVITIS OF BOTH EYES: Primary | ICD-10-CM

## 2019-03-06 PROCEDURE — 92012 INTRM OPH EXAM EST PATIENT: CPT | Mod: S$PBB,,, | Performed by: OPHTHALMOLOGY

## 2019-03-06 PROCEDURE — 99999 PR PBB SHADOW E&M-EST. PATIENT-LVL II: ICD-10-PCS | Mod: PBBFAC,,, | Performed by: OPHTHALMOLOGY

## 2019-03-06 PROCEDURE — 99212 OFFICE O/P EST SF 10 MIN: CPT | Mod: PBBFAC,PO | Performed by: OPHTHALMOLOGY

## 2019-03-06 PROCEDURE — 92012 PR EYE EXAM, EST PATIENT,INTERMED: ICD-10-PCS | Mod: S$PBB,,, | Performed by: OPHTHALMOLOGY

## 2019-03-06 PROCEDURE — 99999 PR PBB SHADOW E&M-EST. PATIENT-LVL II: CPT | Mod: PBBFAC,,, | Performed by: OPHTHALMOLOGY

## 2019-03-06 NOTE — LETTER
March 6, 2019      Jonathan Sanderson MD  [address]  [facility] [address]           [place] [facility]  [address]  [place] [address]  Phone: [phone]  Fax: [phone]          Patient: Ailin Brown   MR Number: [id number]   YOB: 1955   Date of Visit: 3/6/2019       Dear Dr. Jonathan Sanderson:    Thank you for referring Ailin Brown to me for evaluation. Attached you will find relevant portions of my assessment and plan of care.    If you have questions, please do not hesitate to call me. I look forward to following Ailin Brown along with you.    Sincerely,    Yoan Gar MD    Enclosure  CC:  No Recipients    If you would like to receive this communication electronically, please contact [email or web address] or [phone] to request more information on [facility] access.    For providers and/or their staff who would like to refer a patient to [facility], please contact us through our one-stop-shop provider referral line, [facility], at [phone].    If you feel you have received this communication in error or would no longer like to receive these types of communications, please e-mail [email or web address]

## 2019-03-06 NOTE — PROGRESS NOTES
Subjective:       Patient ID: Ailin Brown is a 63 y.o. female.    Chief Complaint: Follow-up    HPI     DSL- 2/15/19     64 y/o female is here for acute conjun OS. Pt states she eyes has not   completely resloved. Pt has discharge and irritation. Pt ran out of   Maxitrol few days again.     Eyemeds  Refresh OU PRN     Last edited by Hali García on 3/6/2019  1:39 PM. (History)             Assessment:       1. Bacterial conjunctivitis of both eyes    2. Dry eye syndrome of both eyes        Plan:       Bacterial conj OU-Resolving but still residual discharge OU.  JORGE-Needs more AT's.      Start ofloxacin OU qid x 1 wk.  Trial of Systane Complete OU.  RTC me prn.

## 2019-03-07 ENCOUNTER — DOCUMENTATION ONLY (OUTPATIENT)
Dept: REHABILITATION | Facility: OTHER | Age: 64
End: 2019-03-07

## 2019-03-07 ENCOUNTER — CLINICAL SUPPORT (OUTPATIENT)
Dept: REHABILITATION | Facility: OTHER | Age: 64
End: 2019-03-07
Payer: MEDICARE

## 2019-03-07 DIAGNOSIS — Z91.81 HISTORY OF FALLING: ICD-10-CM

## 2019-03-07 DIAGNOSIS — R26.89 BALANCE PROBLEMS: ICD-10-CM

## 2019-03-07 DIAGNOSIS — M25.561 CHRONIC PAIN OF RIGHT KNEE: ICD-10-CM

## 2019-03-07 DIAGNOSIS — M25.552 LEFT HIP PAIN: ICD-10-CM

## 2019-03-07 DIAGNOSIS — M79.671 RIGHT FOOT PAIN: ICD-10-CM

## 2019-03-07 DIAGNOSIS — R26.81 UNSTEADY GAIT: ICD-10-CM

## 2019-03-07 DIAGNOSIS — G89.29 CHRONIC PAIN OF RIGHT KNEE: ICD-10-CM

## 2019-03-07 PROCEDURE — 97110 THERAPEUTIC EXERCISES: CPT | Mod: PN

## 2019-03-07 NOTE — PROGRESS NOTES
PT/PTA met face to face to discuss pt's treatment plan and progress towards established goals. Pt will be seen by a physical therapist minimally every 6th visit or every 30 days.      Cayla Clark, PT     Garrett Griffith PTA

## 2019-03-07 NOTE — PROGRESS NOTES
"  Physical Therapy Daily Treatment Note     Name: Ailin Carrillo [place]  Clinic Number: [id number]    Therapy Diagnosis:   Encounter Diagnoses   Name Primary?    Right foot pain     Chronic pain of right knee     Left hip pain     Balance problems     Unsteady gait     History of falling      Physician: Mohini Uriostegui PA-C    Visit Date: 3/7/2019    Physician Orders: PT Eval and Treat Evaluate and Treat: Iontophoresis and Modalities, 2 X wk X 4-6 Wks   Medical Diagnosis: M17.11 (ICD-10-CM) - Primary osteoarthritis of right knee M79.671 (ICD-10-CM) - Right foot pain M72.2 (ICD-10-CM) - Plantar fasciitis, right M19.079 (ICD-10-CM) - 1st MTP arthritis    Evaluation Date: 2/20/2019  Authorization Period Expiration: 2/4/2020  Plan of Care Certification Period: 4/5/2019  Visit #/Visits authorized: 2 (authorization pending)    Time In: 3:00 PM  Time Out: 4:00 PM  Total Billable Time: 60 minutes / 1:1 w/ PTA 30 mins    Precautions: Standard, Diabetes and Fall     Subjective     Pt reports: "I am mostly worried about my balance and walking." Pt stated that she had multiple falls in the past and feels like her body is all "banged" up. Pt arrived to tx session without an AD..  She was not compliant with home exercise program.  Response to previous treatment: fine, no changes  Functional change: no new changes    Pain: 4/10  Location: right foot, R knee, L hip    Objective     Ailin received therapeutic exercises to develop strength, endurance, ROM, flexibility, posture and core stabilization for 50 minutes including:  TrA Bracing 10" x 10  TrA w/ adductor ball squeeze 10" x 10  QS 10" x 10  SAQ 20 x 5"  SLR 2 x 10  Bridges 2 x 10 (ceased due to muscular cramps)  Clamshells w/ OTB 2 x 10  SL hip abd 2 x 10  Ankle AROM 4 ways DF/PF/EV/INV w/ YTB 20x      Home Exercises Provided and Patient Education Provided     Education provided:   - importance of hip / ankle strengthening, proper form and technique    Written Home " Exercises Provided: yes.  Exercises were reviewed and Ailin was able to demonstrate them prior to the end of the session.  Ailin demonstrated good  understanding of the education provided.     See EMR under Media for exercises provided prior visit.    Assessment     Pt tolerated treatment today without reports of increased pain. Ceased bridge therex today due to reports of muscular spasms. Pt required tactile cueing to decrease pelvis rolling during SL clams.   Ailin is progressing well towards her goals.   Pt prognosis is Fair.     Pt will continue to benefit from skilled outpatient physical therapy to address the deficits listed in the problem list box on initial evaluation, provide pt/family education and to maximize pt's level of independence in the home and community environment.     Pt's spiritual, cultural and educational needs considered and pt agreeable to plan of care and goals.     Anticipated barriers to physical therapy: multiple co-morbidities, mobility limitations     Goals: --    Plan     Continue with established PT Plan of Care and focus on B LE strengthening.    Garrett Griffith, PTA

## 2019-03-11 ENCOUNTER — TELEPHONE (OUTPATIENT)
Dept: INTERNAL MEDICINE | Facility: CLINIC | Age: 64
End: 2019-03-11

## 2019-03-11 DIAGNOSIS — R19.7 DIARRHEA, UNSPECIFIED TYPE: Primary | ICD-10-CM

## 2019-03-11 DIAGNOSIS — K58.9 IRRITABLE BOWEL SYNDROME, UNSPECIFIED TYPE: Primary | ICD-10-CM

## 2019-03-11 DIAGNOSIS — R10.9 ABDOMINAL PAIN, UNSPECIFIED ABDOMINAL LOCATION: ICD-10-CM

## 2019-03-11 RX ORDER — DICYCLOMINE HYDROCHLORIDE 10 MG/1
10 CAPSULE ORAL 3 TIMES DAILY
Qty: 90 CAPSULE | Refills: 0 | Status: SHIPPED | OUTPATIENT
Start: 2019-03-11 | End: 2019-04-10

## 2019-03-11 NOTE — TELEPHONE ENCOUNTER
----- Message from Sharmila Buchanan sent at 3/11/2019  4:17 PM CDT -----  Contact: Casey Floyd/Partner/[phone]   2nd message  Patients symptoms have not improved. Can patient get a referral to gastro.  Still with diahrea and pain in stomach.     Please call and advise  Thank you

## 2019-03-11 NOTE — TELEPHONE ENCOUNTER
dicyclomine (BENTYL) 10 MG capsule 14 capsule 0 2/25/2019 3/4/2019 --   Sig - Route: Take 1 capsule (10 mg total) by mouth 2 (two) times daily. for 7 days - Oral   Sent to pharmacy as: dicyclomine (BENTYL) 10 MG capsule   Class: Normal   Order: [id number]   Date/Time Signed: 2/25/2019 17:54       E-Prescribing Status: Receipt confirmed by pharmacy (2/25/2019  5:54 PM CST)

## 2019-03-11 NOTE — TELEPHONE ENCOUNTER
----- Message from Blossom Collins sent at 3/11/2019 11:26 AM CDT -----  Contact: Patient  [phone]  Patients symptoms have not improved. Can patient get a referral to gastro.  Still with diahrea and pain in stomach.    Please call and advise  Thank you

## 2019-07-24 ENCOUNTER — OFFICE VISIT (OUTPATIENT)
Dept: OPHTHALMOLOGY | Facility: CLINIC | Age: 64
End: 2019-07-24
Payer: MEDICARE

## 2019-07-24 DIAGNOSIS — H04.123 DRY EYE SYNDROME OF BOTH EYES: ICD-10-CM

## 2019-07-24 DIAGNOSIS — H25.13 NUCLEAR SCLEROSIS OF BOTH EYES: ICD-10-CM

## 2019-07-24 DIAGNOSIS — E11.9 DM TYPE 2 WITHOUT RETINOPATHY: ICD-10-CM

## 2019-07-24 DIAGNOSIS — H43.813 VITREOUS DETACHMENT OF BOTH EYES: Primary | ICD-10-CM

## 2019-07-24 DIAGNOSIS — I10 ESSENTIAL HYPERTENSION: ICD-10-CM

## 2019-07-24 PROCEDURE — 99999 PR PBB SHADOW E&M-EST. PATIENT-LVL II: ICD-10-PCS | Mod: PBBFAC,,, | Performed by: OPHTHALMOLOGY

## 2019-07-24 PROCEDURE — 99212 OFFICE O/P EST SF 10 MIN: CPT | Mod: PBBFAC,PO | Performed by: OPHTHALMOLOGY

## 2019-07-24 PROCEDURE — 99999 PR PBB SHADOW E&M-EST. PATIENT-LVL II: CPT | Mod: PBBFAC,,, | Performed by: OPHTHALMOLOGY

## 2019-07-24 PROCEDURE — 92014 PR EYE EXAM, EST PATIENT,COMPREHESV: ICD-10-PCS | Mod: S$PBB,,, | Performed by: OPHTHALMOLOGY

## 2019-07-24 PROCEDURE — 92014 COMPRE OPH EXAM EST PT 1/>: CPT | Mod: S$PBB,,, | Performed by: OPHTHALMOLOGY

## 2019-07-24 NOTE — PROGRESS NOTES
Subjective:       Patient ID: Ailin Brown is a 63 y.o. female.    Chief Complaint: Spots and/or Floaters    HPI     DLS: 3/06/19    Pt c/o flashes of light in her OS x couple of days now. Pt denies any   floaters in OS but does notice floaters in OD but that's been going on. Pt   states it looks like she is seeing a semi [place] in her OS. Pt states her   diabetes medication has changed and since then her eyes have become dry.   Pt states she has been on high dosage of steroids.     Meds: Refresh Plus once every hour OU    Last edited by Clarisa Lomeli on 7/24/2019  8:08 AM. (History)             Assessment:       1. Vitreous detachment of both eyes    2. Nuclear sclerosis of both eyes    3. Dry eye syndrome of both eyes    4. DM type 2 without retinopathy    5. Essential hypertension        Plan:       PVD's OU-New one OS, causing flashing lights & floaters.  Cataracts- Not visually significant.  JORGE-Doing well.  DM-No NPDR OU.  HTN-No retinopathy OU.        AT's.  Control DM & HTN.  RTC 3/2020.

## 2020-02-27 ENCOUNTER — OFFICE VISIT (OUTPATIENT)
Dept: OPHTHALMOLOGY | Facility: CLINIC | Age: 65
End: 2020-02-27
Payer: MEDICARE

## 2020-02-27 DIAGNOSIS — H16.101 SUPERFICIAL KERATITIS OF RIGHT EYE: Primary | ICD-10-CM

## 2020-02-27 DIAGNOSIS — H04.123 DRY EYE SYNDROME OF BOTH EYES: ICD-10-CM

## 2020-02-27 DIAGNOSIS — H25.13 NUCLEAR SCLEROSIS OF BOTH EYES: ICD-10-CM

## 2020-02-27 PROCEDURE — 99212 OFFICE O/P EST SF 10 MIN: CPT | Mod: PBBFAC,PO | Performed by: OPHTHALMOLOGY

## 2020-02-27 PROCEDURE — 99999 PR PBB SHADOW E&M-EST. PATIENT-LVL II: ICD-10-PCS | Mod: PBBFAC,,, | Performed by: OPHTHALMOLOGY

## 2020-02-27 PROCEDURE — 99999 PR PBB SHADOW E&M-EST. PATIENT-LVL II: CPT | Mod: PBBFAC,,, | Performed by: OPHTHALMOLOGY

## 2020-02-27 PROCEDURE — 92012 PR EYE EXAM, EST PATIENT,INTERMED: ICD-10-PCS | Mod: S$PBB,,, | Performed by: OPHTHALMOLOGY

## 2020-02-27 PROCEDURE — 92012 INTRM OPH EXAM EST PATIENT: CPT | Mod: S$PBB,,, | Performed by: OPHTHALMOLOGY

## 2020-02-27 NOTE — PROGRESS NOTES
Subjective:       Patient ID: Ailin Brown is a 64 y.o. female.    Chief Complaint: Concerns About Ocular Health    HPI     DSL- 7/24/2019     65 y/o female is here for Poss K-abrasion of the RT eye. Pt states her   puppy knocked her glasses and scratched her RT eye on 2/26/2020. Pt today   present with a c/o of light sensitivity, redness, tears, slight pain, and   blurry Va.     Eyemeds  Systane Gel QHS OU   At's non-preservative drops OU PRN    Last edited by Hali García on 2/27/2020  2:17 PM. (History)             Assessment:       1. Superficial keratitis of right eye    2. Dry eye syndrome of both eyes    3. Nuclear sclerosis of both eyes        Plan:       Superficial keratitis OD from puppy scratching OD-No abrasion. Needs EES aleida.  JORGE-Stable OU.  Cataracts- Not visually significant.      AT's.  EES aleida OD qhs x 1 night.  RTC in 1-2 mos as scheduled for DFE.

## 2020-04-15 ENCOUNTER — TELEPHONE (OUTPATIENT)
Dept: OPHTHALMOLOGY | Facility: CLINIC | Age: 65
End: 2020-04-15

## 2020-04-21 ENCOUNTER — TELEPHONE (OUTPATIENT)
Dept: OPHTHALMOLOGY | Facility: CLINIC | Age: 65
End: 2020-04-21

## 2020-04-22 ENCOUNTER — TELEPHONE (OUTPATIENT)
Dept: OPHTHALMOLOGY | Facility: CLINIC | Age: 65
End: 2020-04-22

## 2020-07-06 ENCOUNTER — OFFICE VISIT (OUTPATIENT)
Dept: OPHTHALMOLOGY | Facility: CLINIC | Age: 65
End: 2020-07-06
Payer: MEDICARE

## 2020-07-06 DIAGNOSIS — H25.13 NUCLEAR SCLEROSIS OF BOTH EYES: Primary | ICD-10-CM

## 2020-07-06 DIAGNOSIS — H43.813 VITREOUS DETACHMENT OF BOTH EYES: ICD-10-CM

## 2020-07-06 DIAGNOSIS — E11.9 DM TYPE 2 WITHOUT RETINOPATHY: ICD-10-CM

## 2020-07-06 DIAGNOSIS — H52.7 REFRACTIVE ERROR: ICD-10-CM

## 2020-07-06 DIAGNOSIS — I10 ESSENTIAL HYPERTENSION: ICD-10-CM

## 2020-07-06 DIAGNOSIS — H04.123 DRY EYE SYNDROME OF BOTH EYES: ICD-10-CM

## 2020-07-06 PROCEDURE — 99212 OFFICE O/P EST SF 10 MIN: CPT | Mod: PBBFAC,PO | Performed by: OPHTHALMOLOGY

## 2020-07-06 PROCEDURE — 99999 PR PBB SHADOW E&M-EST. PATIENT-LVL II: CPT | Mod: PBBFAC,,, | Performed by: OPHTHALMOLOGY

## 2020-07-06 PROCEDURE — 99999 PR PBB SHADOW E&M-EST. PATIENT-LVL II: ICD-10-PCS | Mod: PBBFAC,,, | Performed by: OPHTHALMOLOGY

## 2020-07-06 PROCEDURE — 92014 PR EYE EXAM, EST PATIENT,COMPREHESV: ICD-10-PCS | Mod: S$PBB,,, | Performed by: OPHTHALMOLOGY

## 2020-07-06 PROCEDURE — 92014 COMPRE OPH EXAM EST PT 1/>: CPT | Mod: S$PBB,,, | Performed by: OPHTHALMOLOGY

## 2020-07-06 RX ORDER — ALBUTEROL SULFATE 0.83 MG/ML
2.5 SOLUTION RESPIRATORY (INHALATION)
COMMUNITY
Start: 2015-09-22

## 2020-07-06 RX ORDER — GLIPIZIDE 5 MG/1
5 TABLET ORAL 2 TIMES DAILY WITH MEALS
COMMUNITY
Start: 2020-04-29

## 2020-07-06 RX ORDER — FLUTICASONE PROPIONATE 50 MCG
SPRAY, SUSPENSION (ML) NASAL
COMMUNITY
Start: 2020-07-01

## 2020-07-06 RX ORDER — PANTOPRAZOLE SODIUM 40 MG/1
40 TABLET, DELAYED RELEASE ORAL DAILY
COMMUNITY
Start: 2020-04-02

## 2020-07-06 RX ORDER — DAPAGLIFLOZIN 10 MG/1
TABLET, FILM COATED ORAL DAILY
COMMUNITY
Start: 2020-06-18

## 2020-07-06 RX ORDER — IPRATROPIUM BROMIDE 0.5 MG/2.5ML
1 SOLUTION RESPIRATORY (INHALATION)
COMMUNITY
Start: 2015-09-25

## 2020-07-06 RX ORDER — FLUTICASONE FUROATE 200 UG/1
POWDER RESPIRATORY (INHALATION) DAILY
COMMUNITY
Start: 2020-06-15

## 2020-07-06 RX ORDER — LANCETS
EACH MISCELLANEOUS
COMMUNITY
Start: 2018-09-28

## 2020-07-06 RX ORDER — FENOFIBRATE 200 MG/1
200 CAPSULE ORAL
COMMUNITY
Start: 2019-01-09

## 2020-07-06 RX ORDER — MONTELUKAST SODIUM 10 MG/1
10 TABLET ORAL DAILY
COMMUNITY
Start: 2020-04-08

## 2020-07-06 NOTE — PROGRESS NOTES
"Subjective:       Patient ID: Ailin Brown is a 64 y.o. female.    Chief Complaint: Concerns About Ocular Health    HPI     64 y.o. female is here for Ocular health Check. Pt states that in April   she had a fall and fracture her skull above the right eye. Right eye   stayed swollen for 2 months. Along with headaches and light sensitivity.   "Vision is off, cant read very well." Do have trouble with glare, right   eye. Dry Eye, bilateral. Tearing right eye for about a month.     Eye Med's: Restasis BID OU     Last edited by NANETTE Khalil on 7/6/2020  2:07 PM. (History)             Assessment:       1. Nuclear sclerosis of both eyes    2. Dry eye syndrome of both eyes    3. Vitreous detachment of both eyes    4. DM type 2 without retinopathy    5. Essential hypertension    6. Refractive error        Plan:       Cataracts- Not visually significant.  JORGE-Doing well on Restasis OU.  PVD's OU-Stable.  DM-No NPDR OU.  HTN-No retinopathy OU.  RE-Pt wants MRx.      Cont Restasis OU.  Control DM & HTN.  Give MRx.  RTC 1 yr.     "

## 2021-02-25 NOTE — ED TRIAGE NOTES
Fell and injured right toes no other trauma said she had gotten dizzy   Plan: Take Spironolactone 25mg first day of menses along with Doryx 200mg, when patient is ready to refill SulfaCleanser will send Avar.\\n\\nChemical peel #1 2/25/2021 Samples Given: Doryx 200mg Continue Regimen: Sulfa wash Discontinue Regimen: Spironolactone 25 Detail Level: Zone Plan: Recommended chemical peels series of 3

## 2021-04-26 ENCOUNTER — PATIENT MESSAGE (OUTPATIENT)
Dept: RESEARCH | Facility: HOSPITAL | Age: 66
End: 2021-04-26

## 2021-10-04 ENCOUNTER — TELEPHONE (OUTPATIENT)
Dept: OPHTHALMOLOGY | Facility: CLINIC | Age: 66
End: 2021-10-04

## 2021-11-05 ENCOUNTER — OFFICE VISIT (OUTPATIENT)
Dept: OPHTHALMOLOGY | Facility: CLINIC | Age: 66
End: 2021-11-05
Payer: MEDICARE

## 2021-11-05 DIAGNOSIS — E11.9 DM TYPE 2 WITHOUT RETINOPATHY: ICD-10-CM

## 2021-11-05 DIAGNOSIS — H04.123 DRY EYE SYNDROME OF BOTH EYES: ICD-10-CM

## 2021-11-05 DIAGNOSIS — H43.813 VITREOUS DETACHMENT OF BOTH EYES: ICD-10-CM

## 2021-11-05 DIAGNOSIS — I10 ESSENTIAL HYPERTENSION: ICD-10-CM

## 2021-11-05 DIAGNOSIS — H52.7 REFRACTIVE ERROR: ICD-10-CM

## 2021-11-05 DIAGNOSIS — H25.13 NUCLEAR SCLEROSIS OF BOTH EYES: Primary | ICD-10-CM

## 2021-11-05 PROCEDURE — 99999 PR PBB SHADOW E&M-EST. PATIENT-LVL II: ICD-10-PCS | Mod: PBBFAC,,, | Performed by: OPHTHALMOLOGY

## 2021-11-05 PROCEDURE — 99212 OFFICE O/P EST SF 10 MIN: CPT | Mod: PBBFAC,PO | Performed by: OPHTHALMOLOGY

## 2021-11-05 PROCEDURE — 99024 PR POST-OP FOLLOW-UP VISIT: ICD-10-PCS | Mod: POP,,, | Performed by: OPHTHALMOLOGY

## 2021-11-05 PROCEDURE — 99024 POSTOP FOLLOW-UP VISIT: CPT | Mod: POP,,, | Performed by: OPHTHALMOLOGY

## 2021-11-05 PROCEDURE — 99999 PR PBB SHADOW E&M-EST. PATIENT-LVL II: CPT | Mod: PBBFAC,,, | Performed by: OPHTHALMOLOGY

## 2022-08-12 ENCOUNTER — OFFICE VISIT (OUTPATIENT)
Dept: OPHTHALMOLOGY | Facility: CLINIC | Age: 67
End: 2022-08-12
Payer: MEDICARE

## 2022-08-12 DIAGNOSIS — H25.13 NUCLEAR SCLEROSIS OF BOTH EYES: Primary | ICD-10-CM

## 2022-08-12 DIAGNOSIS — H04.123 DRY EYE SYNDROME OF BOTH EYES: ICD-10-CM

## 2022-08-12 DIAGNOSIS — I10 ESSENTIAL HYPERTENSION: ICD-10-CM

## 2022-08-12 DIAGNOSIS — H43.813 VITREOUS DETACHMENT OF BOTH EYES: ICD-10-CM

## 2022-08-12 DIAGNOSIS — H52.7 REFRACTIVE ERROR: ICD-10-CM

## 2022-08-12 DIAGNOSIS — E11.9 DM TYPE 2 WITHOUT RETINOPATHY: ICD-10-CM

## 2022-08-12 DIAGNOSIS — H10.13 ALLERGIC CONJUNCTIVITIS OF BOTH EYES: ICD-10-CM

## 2022-08-12 PROCEDURE — 99999 PR PBB SHADOW E&M-EST. PATIENT-LVL II: CPT | Mod: PBBFAC,,, | Performed by: OPHTHALMOLOGY

## 2022-08-12 PROCEDURE — 92014 PR EYE EXAM, EST PATIENT,COMPREHESV: ICD-10-PCS | Mod: S$PBB,,, | Performed by: OPHTHALMOLOGY

## 2022-08-12 PROCEDURE — 99999 PR PBB SHADOW E&M-EST. PATIENT-LVL II: ICD-10-PCS | Mod: PBBFAC,,, | Performed by: OPHTHALMOLOGY

## 2022-08-12 PROCEDURE — 92014 COMPRE OPH EXAM EST PT 1/>: CPT | Mod: S$PBB,,, | Performed by: OPHTHALMOLOGY

## 2022-08-12 PROCEDURE — 99212 OFFICE O/P EST SF 10 MIN: CPT | Mod: PBBFAC,PO | Performed by: OPHTHALMOLOGY

## 2022-08-13 NOTE — PROGRESS NOTES
Subjective:       Patient ID: Ailin Brown is a 66 y.o. female.    Chief Complaint: Blurred Vision (Ailin Brown is a 67 y/o female.)    HPI     Blurred Vision      Additional comments: Ailin Brown is a 67 y/o female.              Comments     Pt is here for annual eye exam.  Pt state pain from bright light.  Pt state floaters OU.    BSL:113  A1C:7.8    Eye meds:  Systane OU  Restasis BID OU                 Last edited by Ingris Jackson on 8/12/2022 10:59 AM. (History)             Assessment:       1. Nuclear sclerosis of both eyes    2. Dry eye syndrome of both eyes    3. Allergic conjunctivitis of both eyes    4. Vitreous detachment of both eyes    5. DM type 2 without retinopathy    6. Essential hypertension    7. Refractive error        Plan:       Cataracts- Not visually significant.  JORGE-Needs more AT's.  Allergic conj OU-Needs Pataday.  PVD's OU-Stable.  DM-No NPDROU.  HTN-No retinopathy OU.  RE-Pt wants MRx.      AT's.  Pataday.  Control DM & HTN.  Give MRx.  RTC 1 yr.

## 2023-10-11 ENCOUNTER — OFFICE VISIT (OUTPATIENT)
Dept: OPHTHALMOLOGY | Facility: CLINIC | Age: 68
End: 2023-10-11
Payer: MEDICARE

## 2023-10-11 DIAGNOSIS — H52.7 REFRACTIVE ERROR: ICD-10-CM

## 2023-10-11 DIAGNOSIS — H25.13 NUCLEAR SCLEROSIS OF BOTH EYES: Primary | ICD-10-CM

## 2023-10-11 DIAGNOSIS — H43.813 VITREOUS DETACHMENT OF BOTH EYES: ICD-10-CM

## 2023-10-11 DIAGNOSIS — I10 ESSENTIAL HYPERTENSION: ICD-10-CM

## 2023-10-11 DIAGNOSIS — E11.9 DM TYPE 2 WITHOUT RETINOPATHY: ICD-10-CM

## 2023-10-11 DIAGNOSIS — H04.123 DRY EYE SYNDROME OF BOTH EYES: ICD-10-CM

## 2023-10-11 PROCEDURE — 99999 PR PBB SHADOW E&M-EST. PATIENT-LVL II: ICD-10-PCS | Mod: PBBFAC,,, | Performed by: OPHTHALMOLOGY

## 2023-10-11 PROCEDURE — 99999 PR PBB SHADOW E&M-EST. PATIENT-LVL II: CPT | Mod: PBBFAC,,, | Performed by: OPHTHALMOLOGY

## 2023-10-11 PROCEDURE — 92014 PR EYE EXAM, EST PATIENT,COMPREHESV: ICD-10-PCS | Mod: S$PBB,,, | Performed by: OPHTHALMOLOGY

## 2023-10-11 PROCEDURE — 99212 OFFICE O/P EST SF 10 MIN: CPT | Mod: PBBFAC,PO | Performed by: OPHTHALMOLOGY

## 2023-10-11 PROCEDURE — 92014 COMPRE OPH EXAM EST PT 1/>: CPT | Mod: S$PBB,,, | Performed by: OPHTHALMOLOGY

## 2023-10-11 NOTE — PROGRESS NOTES
Subjective:       Patient ID: Ailin Brown is a 68 y.o. female.    Chief Complaint: Post-op Evaluation and Annual Exam    HPI    Pt is here for annual eye exam.  Pt state pain from bright light.   Pt state floaters OU.    BSL:113  A1C:7.8    Eye meds:  Systane OU          Last edited by Jessica Cowan on 10/11/2023  8:57 AM.             Assessment:       1. Nuclear sclerosis of both eyes    2. Dry eye syndrome of both eyes    3. Vitreous detachment of both eyes    4. DM type 2 without retinopathy    5. Essential hypertension    6. Refractive error        Plan:       Cataracts- Not visually significant.  JORGE OU-Doing well.  PVD's OU-Doing well.  DM-No NPDR OU.  HTN-No retinopathy OU.  RE-Pt wants MRx from last visit.      AT's.  Control DM & HTN.  Give MRx from last visit.  RTC 1 yr.

## 2024-05-28 NOTE — PROGRESS NOTES
Problem: Pain  Goal: Acceptable pain level achieved/maintained at rest using appropriate pain scale for the patient  Outcome: Monitoring/Evaluating progress     Problem: At Risk for Falls  Goal: Patient does not fall  Outcome: Monitoring/Evaluating progress      2 pt identifiers. Pr requested to remain 15 minutes. Pt received vaccine.

## 2024-10-03 NOTE — LETTER
February 5, 2019      Jonathan Sanderson MD  [address]  [facility] [address]           [facility]  [address]  [facility] [address]  Phone: [phone]          Patient: Ailin Brown   MR Number: [id number]   YOB: 1955   Date of Visit: 2/4/2019       Dear Dr. Jonathan Sanderson:    Thank you for referring Ailin Brown to me for evaluation. Attached you will find relevant portions of my assessment and plan of care.    If you have questions, please do not hesitate to call me. I look forward to following Ailin Brown along with you.    Sincerely,    Mohini Uriostegui PA-C    Enclosure  CC:  No Recipients    If you would like to receive this communication electronically, please contact [email or web address] or [phone] to request more information on [employer] Link access.    For providers and/or their staff who would like to refer a patient to [facility], please contact us through our one-stop-shop provider referral line, [facility], at [phone].    If you feel you have received this communication in error or would no longer like to receive these types of communications, please e-mail [email or web address]          General Sunscreen Counseling: I recommended a broad spectrum sunscreen with a SPF of 30 or higher.  I explained that SPF 30 sunscreens block approximately 97 percent of the sun's harmful rays.  Sunscreens should be applied at least 15 minutes prior to expected sun exposure and then every 2 hours after that as long as sun exposure continues. If swimming or exercising sunscreen should be reapplied every 45 minutes to an hour after getting wet or sweating.  One ounce, or the equivalent of a shot glass full of sunscreen, is adequate to protect the skin not covered by a bathing suit. I also recommended a lip balm with a sunscreen as well. Sun protective clothing can be used in lieu of sunscreen but must be worn the entire time you are exposed to the sun's rays. Detail Level: Detailed

## 2025-02-17 ENCOUNTER — TELEPHONE (OUTPATIENT)
Dept: OPHTHALMOLOGY | Facility: CLINIC | Age: 70
End: 2025-02-17
Payer: MEDICARE

## 2025-02-17 NOTE — TELEPHONE ENCOUNTER
Called to schedule pt for cataract evaluation but also would like a diabetic eye exam with an updated Mrx.    ----- Message from Sally sent at 2/17/2025  9:37 AM CST -----  Regarding: Appt  Contact: Pt   [phone]   Current Appt date:  02/17/25 Type of Appt: Est Pt  Physician: Yoan Gar, MDReason for rescheduling:  Pt went to wrong office location  Pt requesting appt for a few months out Caller: Ailin  Contact Preference:  [phone]

## 2025-05-21 ENCOUNTER — TELEPHONE (OUTPATIENT)
Dept: OPHTHALMOLOGY | Facility: CLINIC | Age: 70
End: 2025-05-21
Payer: MEDICARE

## 2025-05-21 NOTE — TELEPHONE ENCOUNTER
----- Message from Jordyn sent at 5/21/2025  3:21 PM CDT -----  Regarding: Reschedule 04-  Contact: KARLY HERR [[id number]]  RESCHEDULE/APPTS Current Appt Date:04- Type of Appt:Diabetic eye exam## Need Updated Mrx before dilation##Per pt.  Physician:Aroldollmette Reason for Scheduling:Pt needs to reschedule. If calling 05- call in afternoon  Caller:KARLY HERR [[id number]] Contact Preference:[phone] (home)  ----- Message -----  From: Jordyn Mario  Sent: 5/21/2025   3:23 PM CDT  To: [facility] Staff  Subject: Reschedule 04-                            RESCHEDULE/APPTS Current Appt Date:04- Type of Appt:Diabetic eye exam## Need Updated Mrx before dilation##Per pt.  Physician:Aroldollmetpratima Reason for Scheduling:Pt needs to reschedule  Caller:KARLY HERR [[id number]] Contact Preference:[phone] (home)

## 2025-08-11 ENCOUNTER — OFFICE VISIT (OUTPATIENT)
Dept: OPHTHALMOLOGY | Facility: CLINIC | Age: 70
End: 2025-08-11
Payer: MEDICARE

## 2025-08-11 DIAGNOSIS — H04.123 DRY EYE SYNDROME OF BOTH EYES: ICD-10-CM

## 2025-08-11 DIAGNOSIS — E11.9 DM TYPE 2 WITHOUT RETINOPATHY: ICD-10-CM

## 2025-08-11 DIAGNOSIS — H43.813 VITREOUS DETACHMENT OF BOTH EYES: ICD-10-CM

## 2025-08-11 DIAGNOSIS — I10 ESSENTIAL HYPERTENSION: ICD-10-CM

## 2025-08-11 DIAGNOSIS — H25.13 NUCLEAR SCLEROSIS OF BOTH EYES: Primary | ICD-10-CM

## 2025-08-11 DIAGNOSIS — H52.7 REFRACTIVE ERROR: ICD-10-CM

## 2025-08-11 PROCEDURE — 99999 PR PBB SHADOW E&M-EST. PATIENT-LVL II: CPT | Mod: PBBFAC,,, | Performed by: OPHTHALMOLOGY

## 2025-08-11 PROCEDURE — 92014 COMPRE OPH EXAM EST PT 1/>: CPT | Mod: S$PBB,,, | Performed by: OPHTHALMOLOGY

## 2025-08-11 PROCEDURE — 99212 OFFICE O/P EST SF 10 MIN: CPT | Mod: PBBFAC,PO | Performed by: OPHTHALMOLOGY
